# Patient Record
Sex: FEMALE | Race: ASIAN | Employment: FULL TIME | URBAN - METROPOLITAN AREA
[De-identification: names, ages, dates, MRNs, and addresses within clinical notes are randomized per-mention and may not be internally consistent; named-entity substitution may affect disease eponyms.]

---

## 2017-06-12 PROCEDURE — 86901 BLOOD TYPING SEROLOGIC RH(D): CPT | Performed by: OBSTETRICS & GYNECOLOGY

## 2017-06-12 PROCEDURE — 36415 COLL VENOUS BLD VENIPUNCTURE: CPT | Performed by: OBSTETRICS & GYNECOLOGY

## 2017-06-12 PROCEDURE — 86850 RBC ANTIBODY SCREEN: CPT | Performed by: OBSTETRICS & GYNECOLOGY

## 2017-06-12 PROCEDURE — 87340 HEPATITIS B SURFACE AG IA: CPT | Performed by: OBSTETRICS & GYNECOLOGY

## 2017-06-12 PROCEDURE — 86762 RUBELLA ANTIBODY: CPT | Performed by: OBSTETRICS & GYNECOLOGY

## 2017-06-12 PROCEDURE — 87389 HIV-1 AG W/HIV-1&-2 AB AG IA: CPT | Performed by: OBSTETRICS & GYNECOLOGY

## 2017-06-12 PROCEDURE — 88175 CYTOPATH C/V AUTO FLUID REDO: CPT | Performed by: OBSTETRICS & GYNECOLOGY

## 2017-06-12 PROCEDURE — 86803 HEPATITIS C AB TEST: CPT | Performed by: OBSTETRICS & GYNECOLOGY

## 2017-06-12 PROCEDURE — 86900 BLOOD TYPING SEROLOGIC ABO: CPT | Performed by: OBSTETRICS & GYNECOLOGY

## 2017-06-12 PROCEDURE — 85025 COMPLETE CBC W/AUTO DIFF WBC: CPT | Performed by: OBSTETRICS & GYNECOLOGY

## 2017-06-12 PROCEDURE — 86780 TREPONEMA PALLIDUM: CPT | Performed by: OBSTETRICS & GYNECOLOGY

## 2018-11-30 PROCEDURE — 88175 CYTOPATH C/V AUTO FLUID REDO: CPT | Performed by: OBSTETRICS & GYNECOLOGY

## 2019-08-28 ENCOUNTER — OFFICE VISIT (OUTPATIENT)
Dept: FAMILY MEDICINE CLINIC | Facility: CLINIC | Age: 29
End: 2019-08-28
Payer: COMMERCIAL

## 2019-08-28 ENCOUNTER — MED REC SCAN ONLY (OUTPATIENT)
Dept: FAMILY MEDICINE CLINIC | Facility: CLINIC | Age: 29
End: 2019-08-28

## 2019-08-28 VITALS
TEMPERATURE: 98 F | SYSTOLIC BLOOD PRESSURE: 100 MMHG | OXYGEN SATURATION: 99 % | DIASTOLIC BLOOD PRESSURE: 70 MMHG | RESPIRATION RATE: 16 BRPM | WEIGHT: 131 LBS | HEART RATE: 82 BPM | BODY MASS INDEX: 21.56 KG/M2 | HEIGHT: 65.5 IN

## 2019-08-28 DIAGNOSIS — R21 RASH: Primary | ICD-10-CM

## 2019-08-28 PROCEDURE — 99203 OFFICE O/P NEW LOW 30 MIN: CPT | Performed by: NURSE PRACTITIONER

## 2019-08-28 NOTE — PROGRESS NOTES
Connor Ortiz is a 34year old female. HPI:   Patient presents today as a new patient to establish care. Patient reporting a red, itchy, patchy rash intermittently for the past 1 week.  Reports that in the beginning she would get the rash to her arms and le 100/70   Pulse 82   Temp 98 °F (36.7 °C) (Oral)   Resp 16   Ht 65.5\"   Wt 131 lb   LMP 08/12/2019   SpO2 99%   BMI 21.47 kg/m²   GENERAL: well developed, well nourished,in no apparent distress  SKIN: mildly erythematous, blanchable rash noted to bilateral

## 2019-08-30 ENCOUNTER — OFFICE VISIT (OUTPATIENT)
Dept: FAMILY MEDICINE CLINIC | Facility: CLINIC | Age: 29
End: 2019-08-30
Payer: COMMERCIAL

## 2019-08-30 ENCOUNTER — APPOINTMENT (OUTPATIENT)
Dept: LAB | Age: 29
End: 2019-08-30
Attending: NURSE PRACTITIONER
Payer: COMMERCIAL

## 2019-08-30 VITALS
TEMPERATURE: 99 F | WEIGHT: 130 LBS | HEIGHT: 65.5 IN | DIASTOLIC BLOOD PRESSURE: 62 MMHG | RESPIRATION RATE: 16 BRPM | OXYGEN SATURATION: 97 % | HEART RATE: 74 BPM | SYSTOLIC BLOOD PRESSURE: 100 MMHG | BODY MASS INDEX: 21.4 KG/M2

## 2019-08-30 DIAGNOSIS — R74.8 ELEVATED ALKALINE PHOSPHATASE LEVEL: ICD-10-CM

## 2019-08-30 DIAGNOSIS — R79.89 ELEVATED LFTS: Primary | ICD-10-CM

## 2019-08-30 DIAGNOSIS — R79.89 ELEVATED LFTS: ICD-10-CM

## 2019-08-30 DIAGNOSIS — E83.51 HYPOCALCEMIA: ICD-10-CM

## 2019-08-30 LAB
ALBUMIN SERPL-MCNC: 3.9 G/DL (ref 3.4–5)
ALBUMIN/GLOB SERPL: 1.1 {RATIO} (ref 1–2)
ALP LIVER SERPL-CCNC: 158 U/L (ref 37–98)
ALT SERPL-CCNC: 117 U/L (ref 13–56)
ANION GAP SERPL CALC-SCNC: 8 MMOL/L (ref 0–18)
AST SERPL-CCNC: 51 U/L (ref 15–37)
BILIRUB SERPL-MCNC: 0.8 MG/DL (ref 0.1–2)
BUN BLD-MCNC: 12 MG/DL (ref 7–18)
BUN/CREAT SERPL: 16.7 (ref 10–20)
CALCIUM BLD-MCNC: 8.6 MG/DL (ref 8.5–10.1)
CALCIUM BLD-MCNC: 8.7 MG/DL (ref 8.5–10.1)
CHLORIDE SERPL-SCNC: 108 MMOL/L (ref 98–112)
CO2 SERPL-SCNC: 25 MMOL/L (ref 21–32)
CREAT BLD-MCNC: 0.72 MG/DL (ref 0.55–1.02)
GLOBULIN PLAS-MCNC: 3.6 G/DL (ref 2.8–4.4)
GLUCOSE BLD-MCNC: 85 MG/DL (ref 70–99)
HAV IGM SER QL: 2.2 MG/DL (ref 1.6–2.6)
HAV IGM SER QL: NONREACTIVE
HBV CORE IGM SER QL: NONREACTIVE
HBV SURFACE AG SERPL QL IA: NONREACTIVE
HCV AB SERPL QL IA: NONREACTIVE
M PROTEIN MFR SERPL ELPH: 7.5 G/DL (ref 6.4–8.2)
OSMOLALITY SERPL CALC.SUM OF ELEC: 291 MOSM/KG (ref 275–295)
POTASSIUM SERPL-SCNC: 3.9 MMOL/L (ref 3.5–5.1)
SODIUM SERPL-SCNC: 141 MMOL/L (ref 136–145)

## 2019-08-30 PROCEDURE — 99213 OFFICE O/P EST LOW 20 MIN: CPT | Performed by: NURSE PRACTITIONER

## 2019-08-30 PROCEDURE — 80053 COMPREHEN METABOLIC PANEL: CPT | Performed by: NURSE PRACTITIONER

## 2019-08-30 PROCEDURE — 80074 ACUTE HEPATITIS PANEL: CPT | Performed by: NURSE PRACTITIONER

## 2019-08-30 PROCEDURE — 84080 ASSAY ALKALINE PHOSPHATASES: CPT | Performed by: NURSE PRACTITIONER

## 2019-08-30 PROCEDURE — 83735 ASSAY OF MAGNESIUM: CPT | Performed by: NURSE PRACTITIONER

## 2019-08-30 PROCEDURE — 36415 COLL VENOUS BLD VENIPUNCTURE: CPT | Performed by: NURSE PRACTITIONER

## 2019-08-30 NOTE — PROGRESS NOTES
Casa Keller is a 34year old female. HPI:   Patient presents today to discuss recent lab work she had completed with her employer earlier this week. She has brought the labs for me to view on her computer today- she did not print these results out.  Resul distress  HEENT: TM clear bilaterally, nose no congestion, throat clear no erythema without mass.    EYES: PERRLA, EOM intact, sclera clear without injection  NECK: supple,no adenopathy, thyroid normal to palpation  LUNGS: clear to auscultation no rales, rh recommendations.

## 2019-09-02 LAB
ALK-PHOSPHATASE BONE CALC: 30 U/L
ALK-PHOSPHATASE LIVER CALC: 118 U/L
ALK-PHOSPHATASE OTHER CALC: 0 U/L
ALKALINE PHOSPHATASE: 148 U/L

## 2021-02-05 ENCOUNTER — OFFICE VISIT (OUTPATIENT)
Dept: FAMILY MEDICINE CLINIC | Facility: CLINIC | Age: 31
End: 2021-02-05
Payer: COMMERCIAL

## 2021-02-05 VITALS
DIASTOLIC BLOOD PRESSURE: 74 MMHG | RESPIRATION RATE: 16 BRPM | HEART RATE: 80 BPM | SYSTOLIC BLOOD PRESSURE: 114 MMHG | BODY MASS INDEX: 21.73 KG/M2 | WEIGHT: 132 LBS | TEMPERATURE: 98 F | HEIGHT: 65.5 IN

## 2021-02-05 DIAGNOSIS — Z23 NEED FOR VACCINATION: ICD-10-CM

## 2021-02-05 DIAGNOSIS — Z13.228 SCREENING FOR ENDOCRINE, METABOLIC AND IMMUNITY DISORDER: ICD-10-CM

## 2021-02-05 DIAGNOSIS — Z13.29 SCREENING FOR ENDOCRINE, METABOLIC AND IMMUNITY DISORDER: ICD-10-CM

## 2021-02-05 DIAGNOSIS — R10.2 PELVIC PAIN: ICD-10-CM

## 2021-02-05 DIAGNOSIS — Z13.89 SCREENING FOR GENITOURINARY CONDITION: ICD-10-CM

## 2021-02-05 DIAGNOSIS — Z13.0 SCREENING FOR ENDOCRINE, METABOLIC AND IMMUNITY DISORDER: ICD-10-CM

## 2021-02-05 DIAGNOSIS — Z00.00 LABORATORY EXAMINATION ORDERED AS PART OF A ROUTINE GENERAL MEDICAL EXAMINATION: ICD-10-CM

## 2021-02-05 DIAGNOSIS — Z00.00 ROUTINE GENERAL MEDICAL EXAMINATION AT A HEALTH CARE FACILITY: Primary | ICD-10-CM

## 2021-02-05 DIAGNOSIS — Z30.09 FAMILY PLANNING COUNSELING: ICD-10-CM

## 2021-02-05 PROCEDURE — 99395 PREV VISIT EST AGE 18-39: CPT | Performed by: NURSE PRACTITIONER

## 2021-02-05 PROCEDURE — 3078F DIAST BP <80 MM HG: CPT | Performed by: NURSE PRACTITIONER

## 2021-02-05 PROCEDURE — 3008F BODY MASS INDEX DOCD: CPT | Performed by: NURSE PRACTITIONER

## 2021-02-05 PROCEDURE — 90471 IMMUNIZATION ADMIN: CPT | Performed by: NURSE PRACTITIONER

## 2021-02-05 PROCEDURE — 3074F SYST BP LT 130 MM HG: CPT | Performed by: NURSE PRACTITIONER

## 2021-02-05 PROCEDURE — 90715 TDAP VACCINE 7 YRS/> IM: CPT | Performed by: NURSE PRACTITIONER

## 2021-02-05 NOTE — H&P
HPI:   Gilbert Mathur is a 27year old female who presents for a complete physical exam. Symptoms: denies discharge, itching, burning or dysuria, periods are regular, flow is 6 days, LMP: 01/24/2021. Pap 11/2019 with Dr. Fabiola Del Toro- normal pap.  No history of abno denies cough or shortness of breath with exertion  CHEST:  denies breast changes or pain  CARDIOVASCULAR: denies chest pain or tightness on exertion: no edema  VASCULAR: denies leg cramps  GI: denies abdominal pain, bowel movement changes, blood in stool 30 minutes 3-4 times weekly. Condoms if applicable. The patient indicates understanding of these issues and agrees to the plan. The patient is asked to return for complete physical yearly. There are no Patient Instructions on file for this visit.   No

## 2021-02-08 ENCOUNTER — LAB ENCOUNTER (OUTPATIENT)
Dept: LAB | Age: 31
End: 2021-02-08
Attending: NURSE PRACTITIONER
Payer: COMMERCIAL

## 2021-02-08 DIAGNOSIS — Z13.228 SCREENING FOR ENDOCRINE, METABOLIC AND IMMUNITY DISORDER: ICD-10-CM

## 2021-02-08 DIAGNOSIS — Z13.29 SCREENING FOR ENDOCRINE, METABOLIC AND IMMUNITY DISORDER: ICD-10-CM

## 2021-02-08 DIAGNOSIS — Z00.00 LABORATORY EXAMINATION ORDERED AS PART OF A ROUTINE GENERAL MEDICAL EXAMINATION: ICD-10-CM

## 2021-02-08 DIAGNOSIS — Z13.0 SCREENING FOR ENDOCRINE, METABOLIC AND IMMUNITY DISORDER: ICD-10-CM

## 2021-02-08 DIAGNOSIS — Z00.00 ROUTINE GENERAL MEDICAL EXAMINATION AT A HEALTH CARE FACILITY: ICD-10-CM

## 2021-02-08 DIAGNOSIS — Z13.89 SCREENING FOR GENITOURINARY CONDITION: ICD-10-CM

## 2021-02-08 LAB
ALBUMIN SERPL-MCNC: 4 G/DL (ref 3.4–5)
ALBUMIN/GLOB SERPL: 1 {RATIO} (ref 1–2)
ALP LIVER SERPL-CCNC: 62 U/L
ALT SERPL-CCNC: 30 U/L
ANION GAP SERPL CALC-SCNC: 6 MMOL/L (ref 0–18)
AST SERPL-CCNC: 12 U/L (ref 15–37)
BASOPHILS # BLD AUTO: 0.06 X10(3) UL (ref 0–0.2)
BASOPHILS NFR BLD AUTO: 0.8 %
BILIRUB SERPL-MCNC: 0.8 MG/DL (ref 0.1–2)
BILIRUB UR QL STRIP.AUTO: NEGATIVE
BUN BLD-MCNC: 14 MG/DL (ref 7–18)
BUN/CREAT SERPL: 17.1 (ref 10–20)
CALCIUM BLD-MCNC: 8.9 MG/DL (ref 8.5–10.1)
CHLORIDE SERPL-SCNC: 110 MMOL/L (ref 98–112)
CHOLEST SMN-MCNC: 168 MG/DL (ref ?–200)
CO2 SERPL-SCNC: 23 MMOL/L (ref 21–32)
COLOR UR AUTO: YELLOW
CREAT BLD-MCNC: 0.82 MG/DL
DEPRECATED RDW RBC AUTO: 42.4 FL (ref 35.1–46.3)
EOSINOPHIL # BLD AUTO: 0.15 X10(3) UL (ref 0–0.7)
EOSINOPHIL NFR BLD AUTO: 2 %
ERYTHROCYTE [DISTWIDTH] IN BLOOD BY AUTOMATED COUNT: 13.2 % (ref 11–15)
GLOBULIN PLAS-MCNC: 3.9 G/DL (ref 2.8–4.4)
GLUCOSE BLD-MCNC: 90 MG/DL (ref 70–99)
GLUCOSE UR STRIP.AUTO-MCNC: NEGATIVE MG/DL
HAV AB SER QL IA: REACTIVE
HAV IGM SER QL: NONREACTIVE
HBV SURFACE AB SER QL: NONREACTIVE
HBV SURFACE AB SERPL IA-ACNC: 7.98 MIU/ML
HCT VFR BLD AUTO: 40.6 %
HDLC SERPL-MCNC: 69 MG/DL (ref 40–59)
HGB BLD-MCNC: 13.6 G/DL
IMM GRANULOCYTES # BLD AUTO: 0.02 X10(3) UL (ref 0–1)
IMM GRANULOCYTES NFR BLD: 0.3 %
KETONES UR STRIP.AUTO-MCNC: NEGATIVE MG/DL
LDLC SERPL CALC-MCNC: 83 MG/DL (ref ?–100)
LYMPHOCYTES # BLD AUTO: 2.53 X10(3) UL (ref 1–4)
LYMPHOCYTES NFR BLD AUTO: 33 %
M PROTEIN MFR SERPL ELPH: 7.9 G/DL (ref 6.4–8.2)
MCH RBC QN AUTO: 29.5 PG (ref 26–34)
MCHC RBC AUTO-ENTMCNC: 33.5 G/DL (ref 31–37)
MCV RBC AUTO: 88.1 FL
MONOCYTES # BLD AUTO: 0.52 X10(3) UL (ref 0.1–1)
MONOCYTES NFR BLD AUTO: 6.8 %
NEUTROPHILS # BLD AUTO: 4.39 X10 (3) UL (ref 1.5–7.7)
NEUTROPHILS # BLD AUTO: 4.39 X10(3) UL (ref 1.5–7.7)
NEUTROPHILS NFR BLD AUTO: 57.1 %
NITRITE UR QL STRIP.AUTO: NEGATIVE
NONHDLC SERPL-MCNC: 99 MG/DL (ref ?–130)
OSMOLALITY SERPL CALC.SUM OF ELEC: 288 MOSM/KG (ref 275–295)
PATIENT FASTING Y/N/NP: YES
PATIENT FASTING Y/N/NP: YES
PH UR STRIP.AUTO: 5 [PH] (ref 4.5–8)
PLATELET # BLD AUTO: 289 10(3)UL (ref 150–450)
POTASSIUM SERPL-SCNC: 3.7 MMOL/L (ref 3.5–5.1)
PROT UR STRIP.AUTO-MCNC: NEGATIVE MG/DL
RBC # BLD AUTO: 4.61 X10(6)UL
RBC UR QL AUTO: NEGATIVE
RUBV IGG SER-ACNC: 9.9 IU/ML (ref 10–?)
SODIUM SERPL-SCNC: 139 MMOL/L (ref 136–145)
SP GR UR STRIP.AUTO: 1.02 (ref 1–1.03)
T4 FREE SERPL-MCNC: 0.9 NG/DL (ref 0.8–1.7)
TRIGL SERPL-MCNC: 82 MG/DL (ref 30–149)
TSI SER-ACNC: 2.55 MIU/ML (ref 0.36–3.74)
UROBILINOGEN UR STRIP.AUTO-MCNC: <2 MG/DL
VLDLC SERPL CALC-MCNC: 16 MG/DL (ref 0–30)
WBC # BLD AUTO: 7.7 X10(3) UL (ref 4–11)

## 2021-02-08 PROCEDURE — 86787 VARICELLA-ZOSTER ANTIBODY: CPT

## 2021-02-08 PROCEDURE — 86709 HEPATITIS A IGM ANTIBODY: CPT

## 2021-02-08 PROCEDURE — 86708 HEPATITIS A ANTIBODY: CPT

## 2021-02-08 PROCEDURE — 86735 MUMPS ANTIBODY: CPT

## 2021-02-08 PROCEDURE — 36415 COLL VENOUS BLD VENIPUNCTURE: CPT

## 2021-02-08 PROCEDURE — 80053 COMPREHEN METABOLIC PANEL: CPT

## 2021-02-08 PROCEDURE — 85025 COMPLETE CBC W/AUTO DIFF WBC: CPT

## 2021-02-08 PROCEDURE — 81001 URINALYSIS AUTO W/SCOPE: CPT

## 2021-02-08 PROCEDURE — 86762 RUBELLA ANTIBODY: CPT

## 2021-02-08 PROCEDURE — 80061 LIPID PANEL: CPT

## 2021-02-08 PROCEDURE — 86765 RUBEOLA ANTIBODY: CPT

## 2021-02-08 PROCEDURE — 84443 ASSAY THYROID STIM HORMONE: CPT

## 2021-02-08 PROCEDURE — 84439 ASSAY OF FREE THYROXINE: CPT

## 2021-02-08 PROCEDURE — 86706 HEP B SURFACE ANTIBODY: CPT

## 2021-02-10 ENCOUNTER — HOSPITAL ENCOUNTER (OUTPATIENT)
Dept: ULTRASOUND IMAGING | Age: 31
Discharge: HOME | End: 2021-02-10
Attending: NURSE PRACTITIONER
Payer: COMMERCIAL

## 2021-02-10 DIAGNOSIS — R10.2 PELVIC PAIN: ICD-10-CM

## 2021-02-10 LAB
MEV IGG SER-ACNC: 25.7 AU/ML (ref 16.5–?)
MUV IGG SER IA-ACNC: 7.2 AU/ML (ref 11–?)
VZV IGG SER IA-ACNC: 16.06 (ref 165–?)

## 2021-02-10 PROCEDURE — 76856 US EXAM PELVIC COMPLETE: CPT | Performed by: NURSE PRACTITIONER

## 2021-02-10 PROCEDURE — 76830 TRANSVAGINAL US NON-OB: CPT | Performed by: NURSE PRACTITIONER

## 2021-02-22 ENCOUNTER — NURSE ONLY (OUTPATIENT)
Dept: FAMILY MEDICINE CLINIC | Facility: CLINIC | Age: 31
End: 2021-02-22
Payer: COMMERCIAL

## 2021-02-22 DIAGNOSIS — Z23 NEED FOR VACCINATION: Primary | ICD-10-CM

## 2021-02-22 PROCEDURE — 90746 HEPB VACCINE 3 DOSE ADULT IM: CPT | Performed by: NURSE PRACTITIONER

## 2021-02-22 PROCEDURE — 90707 MMR VACCINE SC: CPT | Performed by: NURSE PRACTITIONER

## 2021-02-22 PROCEDURE — 90471 IMMUNIZATION ADMIN: CPT | Performed by: NURSE PRACTITIONER

## 2021-02-22 PROCEDURE — 90472 IMMUNIZATION ADMIN EACH ADD: CPT | Performed by: NURSE PRACTITIONER

## 2021-02-22 PROCEDURE — 90716 VAR VACCINE LIVE SUBQ: CPT | Performed by: NURSE PRACTITIONER

## 2021-03-18 ENCOUNTER — TELEPHONE (OUTPATIENT)
Dept: FAMILY MEDICINE CLINIC | Facility: CLINIC | Age: 31
End: 2021-03-18

## 2021-03-18 NOTE — TELEPHONE ENCOUNTER
Pt received first 505 [employer] vaccine today. She is scheduled on 3/23 for Varicella # 2 and hep B # 2. She is asking if it is OK to still get the vaccine in our office on the 23rd or should she wait.

## 2021-06-07 ENCOUNTER — LAB ENCOUNTER (OUTPATIENT)
Dept: LAB | Age: 31
End: 2021-06-07
Attending: OBSTETRICS & GYNECOLOGY
Payer: COMMERCIAL

## 2021-06-07 DIAGNOSIS — N92.6 IRREGULAR MENSES: ICD-10-CM

## 2021-06-07 DIAGNOSIS — Z31.69 PRE-CONCEPTION COUNSELING: ICD-10-CM

## 2021-06-07 PROCEDURE — 83002 ASSAY OF GONADOTROPIN (LH): CPT

## 2021-06-07 PROCEDURE — 83001 ASSAY OF GONADOTROPIN (FSH): CPT

## 2021-06-07 PROCEDURE — 84146 ASSAY OF PROLACTIN: CPT

## 2021-06-07 PROCEDURE — 84402 ASSAY OF FREE TESTOSTERONE: CPT

## 2021-06-07 PROCEDURE — 84403 ASSAY OF TOTAL TESTOSTERONE: CPT

## 2021-06-07 PROCEDURE — 83520 IMMUNOASSAY QUANT NOS NONAB: CPT

## 2021-06-07 PROCEDURE — 82670 ASSAY OF TOTAL ESTRADIOL: CPT

## 2021-06-07 PROCEDURE — 36415 COLL VENOUS BLD VENIPUNCTURE: CPT

## 2021-08-21 PROBLEM — Z34.90 SUPERVISION OF NORMAL PREGNANCY: Status: ACTIVE | Noted: 2021-08-21

## 2021-08-21 PROBLEM — Z34.90 SUPERVISION OF NORMAL PREGNANCY (HCC): Status: ACTIVE | Noted: 2021-08-21

## 2021-12-13 ENCOUNTER — TELEPHONE (OUTPATIENT)
Dept: FAMILY MEDICINE CLINIC | Facility: CLINIC | Age: 31
End: 2021-12-13

## 2021-12-13 NOTE — TELEPHONE ENCOUNTER
Pt was told by OB that she needs to get Tdap vaccination after 28 wks. Pt states she will be due the last week of Dec. Please place order and bring name to MANUELA SCHMITT to call back to schedule once order is placed.

## 2021-12-13 NOTE — TELEPHONE ENCOUNTER
S/W pt and informed her that she had a TDAP on 2/5/2021. Advised her to let her OB know with the date and to find out if OB still need her to have another one. Voiced understanding.

## 2021-12-20 PROBLEM — O24.419 GESTATIONAL DIABETES MELLITUS (GDM), ANTEPARTUM: Status: ACTIVE | Noted: 2021-12-20

## 2021-12-20 PROBLEM — O24.419 GESTATIONAL DIABETES MELLITUS (GDM), ANTEPARTUM (HCC): Status: ACTIVE | Noted: 2021-12-20

## 2022-01-06 ENCOUNTER — TELEPHONE (OUTPATIENT)
Dept: FAMILY MEDICINE CLINIC | Facility: CLINIC | Age: 32
End: 2022-01-06

## 2022-01-06 DIAGNOSIS — R00.0 INCREASED HEART RATE: Primary | ICD-10-CM

## 2022-01-06 NOTE — TELEPHONE ENCOUNTER
I think the 13th would be okay. What always see if we can get an echocardiogram and a 24-hour Holter done prior.   If she has not had a recent thyroid panel I would do thyroid panel plus a CMP and CBC

## 2022-01-06 NOTE — TELEPHONE ENCOUNTER
S/W pt. She is 31 weeks pregnant. She saw her OB/GYN today and reported having an elevated heart rate in the 120's at night only. They advised her to call her PCP. This has been going on for the past 1 1/2 weeks. She denies chest pain.  She notices it is

## 2022-01-06 NOTE — TELEPHONE ENCOUNTER
I spoke with pt. She is aware she can keep the appointment with Araseli Bledsoe on 01/13/2022. Pt is going to try and get labs that were ordered and 2 D echo and 24 hour holter monitor done prior to appointment.  Pt agreed to plan and verbalized understanding

## 2022-01-06 NOTE — TELEPHONE ENCOUNTER
Pt has scheduled appt via [employer]/Araceli James for the following:    Message    Appointment For: Alfredo Ericka ([id number])   Visit Type: Toshia Macias ([id number])      1/13/2022    8:00 AM  30 mins. INOCENTE Waddell      [facility] [address] [facility]      Patient Com

## 2022-01-13 ENCOUNTER — OFFICE VISIT (OUTPATIENT)
Dept: FAMILY MEDICINE CLINIC | Facility: CLINIC | Age: 32
End: 2022-01-13
Payer: COMMERCIAL

## 2022-01-13 VITALS
OXYGEN SATURATION: 98 % | TEMPERATURE: 98 F | WEIGHT: 149 LBS | RESPIRATION RATE: 20 BRPM | SYSTOLIC BLOOD PRESSURE: 110 MMHG | HEIGHT: 65 IN | HEART RATE: 82 BPM | BODY MASS INDEX: 24.83 KG/M2 | DIASTOLIC BLOOD PRESSURE: 72 MMHG

## 2022-01-13 DIAGNOSIS — Z3A.31 31 WEEKS GESTATION OF PREGNANCY: ICD-10-CM

## 2022-01-13 DIAGNOSIS — R00.2 PALPITATIONS: Primary | ICD-10-CM

## 2022-01-13 PROCEDURE — 99213 OFFICE O/P EST LOW 20 MIN: CPT | Performed by: NURSE PRACTITIONER

## 2022-01-13 PROCEDURE — 3008F BODY MASS INDEX DOCD: CPT | Performed by: NURSE PRACTITIONER

## 2022-01-13 PROCEDURE — 3078F DIAST BP <80 MM HG: CPT | Performed by: NURSE PRACTITIONER

## 2022-01-13 PROCEDURE — 3074F SYST BP LT 130 MM HG: CPT | Performed by: NURSE PRACTITIONER

## 2022-01-13 NOTE — PROGRESS NOTES
Alfredo Barnett is a 32year old female. HPI:   Patient presents today reporting rapid heartbeat and palpitations that started approximately 2 weeks ago. Reports last episode was 1 day ago prior to this no symptoms for 1 week.  She has an iWatch and her heart Breastfeeding No   BMI 24.79 kg/m²   GENERAL: well developed, well nourished,in no apparent distress  HEENT: TM clear bilaterally, nose no congestion, throat clear no erythema without mass.    EYES: sclera clear without injection  NECK: supple,no adenopathy

## 2022-01-14 ENCOUNTER — LAB ENCOUNTER (OUTPATIENT)
Dept: LAB | Age: 32
End: 2022-01-14
Attending: NURSE PRACTITIONER
Payer: COMMERCIAL

## 2022-01-14 DIAGNOSIS — R00.0 INCREASED HEART RATE: ICD-10-CM

## 2022-01-14 LAB
ALBUMIN SERPL-MCNC: 3 G/DL (ref 3.4–5)
ALBUMIN/GLOB SERPL: 1 {RATIO} (ref 1–2)
ALP LIVER SERPL-CCNC: 64 U/L
ALT SERPL-CCNC: 16 U/L
ANION GAP SERPL CALC-SCNC: 9 MMOL/L (ref 0–18)
AST SERPL-CCNC: 11 U/L (ref 15–37)
BASOPHILS # BLD AUTO: 0.05 X10(3) UL (ref 0–0.2)
BASOPHILS NFR BLD AUTO: 0.4 %
BILIRUB SERPL-MCNC: 0.3 MG/DL (ref 0.1–2)
BUN BLD-MCNC: 8 MG/DL (ref 7–18)
CALCIUM BLD-MCNC: 8.4 MG/DL (ref 8.5–10.1)
CHLORIDE SERPL-SCNC: 111 MMOL/L (ref 98–112)
CO2 SERPL-SCNC: 20 MMOL/L (ref 21–32)
CREAT BLD-MCNC: 0.45 MG/DL
EOSINOPHIL # BLD AUTO: 0.06 X10(3) UL (ref 0–0.7)
EOSINOPHIL NFR BLD AUTO: 0.5 %
ERYTHROCYTE [DISTWIDTH] IN BLOOD BY AUTOMATED COUNT: 14 %
FASTING STATUS PATIENT QL REPORTED: YES
GLOBULIN PLAS-MCNC: 3 G/DL (ref 2.8–4.4)
GLUCOSE BLD-MCNC: 86 MG/DL (ref 70–99)
HCT VFR BLD AUTO: 36.9 %
HGB BLD-MCNC: 11.7 G/DL
IMM GRANULOCYTES # BLD AUTO: 0.34 X10(3) UL (ref 0–1)
IMM GRANULOCYTES NFR BLD: 3 %
LYMPHOCYTES # BLD AUTO: 1.86 X10(3) UL (ref 1–4)
LYMPHOCYTES NFR BLD AUTO: 16.3 %
MCH RBC QN AUTO: 30.2 PG (ref 26–34)
MCHC RBC AUTO-ENTMCNC: 31.7 G/DL (ref 31–37)
MCV RBC AUTO: 95.3 FL
MONOCYTES # BLD AUTO: 0.64 X10(3) UL (ref 0.1–1)
MONOCYTES NFR BLD AUTO: 5.6 %
NEUTROPHILS # BLD AUTO: 8.47 X10 (3) UL (ref 1.5–7.7)
NEUTROPHILS # BLD AUTO: 8.47 X10(3) UL (ref 1.5–7.7)
NEUTROPHILS NFR BLD AUTO: 74.2 %
OSMOLALITY SERPL CALC.SUM OF ELEC: 288 MOSM/KG (ref 275–295)
PLATELET # BLD AUTO: 146 10(3)UL (ref 150–450)
POTASSIUM SERPL-SCNC: 4.1 MMOL/L (ref 3.5–5.1)
PROT SERPL-MCNC: 6 G/DL (ref 6.4–8.2)
RBC # BLD AUTO: 3.87 X10(6)UL
SODIUM SERPL-SCNC: 140 MMOL/L (ref 136–145)
T4 FREE SERPL-MCNC: 1 NG/DL (ref 0.8–1.7)
TSI SER-ACNC: 1.34 MIU/ML (ref 0.36–3.74)
WBC # BLD AUTO: 11.4 X10(3) UL (ref 4–11)

## 2022-01-14 PROCEDURE — 80053 COMPREHEN METABOLIC PANEL: CPT

## 2022-01-14 PROCEDURE — 84443 ASSAY THYROID STIM HORMONE: CPT

## 2022-01-14 PROCEDURE — 36415 COLL VENOUS BLD VENIPUNCTURE: CPT

## 2022-01-14 PROCEDURE — 85025 COMPLETE CBC W/AUTO DIFF WBC: CPT

## 2022-01-14 PROCEDURE — 84439 ASSAY OF FREE THYROXINE: CPT

## 2022-01-18 PROBLEM — D69.6 THROMBOCYTOPENIA AFFECTING PREGNANCY (HCC): Status: ACTIVE | Noted: 2022-01-18

## 2022-01-18 PROBLEM — O99.119 THROMBOCYTOPENIA AFFECTING PREGNANCY (HCC): Status: ACTIVE | Noted: 2022-01-18

## 2022-03-07 ENCOUNTER — HOSPITAL ENCOUNTER (OUTPATIENT)
Dept: CV DIAGNOSTICS | Facility: HOSPITAL | Age: 32
Discharge: HOME | End: 2022-03-07
Attending: NURSE PRACTITIONER
Payer: COMMERCIAL

## 2022-03-07 DIAGNOSIS — R00.0 INCREASED HEART RATE: ICD-10-CM

## 2022-03-07 DIAGNOSIS — R93.1 ABNORMAL ECHOCARDIOGRAM: Primary | ICD-10-CM

## 2022-03-07 PROCEDURE — 93226 XTRNL ECG REC<48 HR SCAN A/R: CPT | Performed by: NURSE PRACTITIONER

## 2022-03-07 PROCEDURE — 93225 XTRNL ECG REC<48 HRS REC: CPT | Performed by: NURSE PRACTITIONER

## 2022-03-07 PROCEDURE — 93306 TTE W/DOPPLER COMPLETE: CPT | Performed by: NURSE PRACTITIONER

## 2022-03-07 PROCEDURE — 93227 XTRNL ECG REC<48 HR R&I: CPT | Performed by: NURSE PRACTITIONER

## 2022-03-08 ENCOUNTER — TELEPHONE (OUTPATIENT)
Dept: OBGYN UNIT | Facility: HOSPITAL | Age: 32
End: 2022-03-08

## 2022-03-09 ENCOUNTER — APPOINTMENT (OUTPATIENT)
Dept: OBGYN CLINIC | Facility: HOSPITAL | Age: 32
End: 2022-03-09
Payer: COMMERCIAL

## 2022-03-09 ENCOUNTER — HOSPITAL ENCOUNTER (INPATIENT)
Facility: HOSPITAL | Age: 32
LOS: 3 days | Discharge: HOME | End: 2022-03-12
Attending: OBSTETRICS & GYNECOLOGY | Admitting: OBSTETRICS & GYNECOLOGY
Payer: COMMERCIAL

## 2022-03-09 PROBLEM — Z34.90 PREGNANCY (HCC): Status: ACTIVE | Noted: 2022-03-09

## 2022-03-09 PROBLEM — Z34.90 PREGNANCY: Status: ACTIVE | Noted: 2022-03-09

## 2022-03-09 LAB
ANTIBODY SCREEN: NEGATIVE
BASOPHILS # BLD AUTO: 0.05 X10(3) UL (ref 0–0.2)
BASOPHILS NFR BLD AUTO: 0.5 %
EOSINOPHIL # BLD AUTO: 0.06 X10(3) UL (ref 0–0.7)
EOSINOPHIL NFR BLD AUTO: 0.6 %
ERYTHROCYTE [DISTWIDTH] IN BLOOD BY AUTOMATED COUNT: 13.9 %
GLUCOSE BLD-MCNC: 96 MG/DL (ref 70–99)
HCT VFR BLD AUTO: 40.4 %
HGB BLD-MCNC: 13.8 G/DL
HIV 1+2 AB+HIV1 P24 AG SERPL QL IA: NONREACTIVE
IMM GRANULOCYTES # BLD AUTO: 0.11 X10(3) UL (ref 0–1)
IMM GRANULOCYTES NFR BLD: 1 %
LYMPHOCYTES # BLD AUTO: 1.99 X10(3) UL (ref 1–4)
LYMPHOCYTES NFR BLD AUTO: 18.3 %
MCH RBC QN AUTO: 31.7 PG (ref 26–34)
MCHC RBC AUTO-ENTMCNC: 34.2 G/DL (ref 31–37)
MCV RBC AUTO: 92.9 FL
MONOCYTES NFR BLD AUTO: 6.4 %
NEUTROPHILS # BLD AUTO: 7.96 X10 (3) UL (ref 1.5–7.7)
NEUTROPHILS # BLD AUTO: 7.96 X10(3) UL (ref 1.5–7.7)
NEUTROPHILS NFR BLD AUTO: 73.2 %
PLATELET # BLD AUTO: 150 10(3)UL (ref 150–450)
RBC # BLD AUTO: 4.35 X10(6)UL
RH BLOOD TYPE: POSITIVE
T PALLIDUM AB SER QL IA: NONREACTIVE
WBC # BLD AUTO: 10.9 X10(3) UL (ref 4–11)

## 2022-03-09 PROCEDURE — 86780 TREPONEMA PALLIDUM: CPT | Performed by: OBSTETRICS & GYNECOLOGY

## 2022-03-09 PROCEDURE — 82947 ASSAY GLUCOSE BLOOD QUANT: CPT | Performed by: OBSTETRICS & GYNECOLOGY

## 2022-03-09 PROCEDURE — 85025 COMPLETE CBC W/AUTO DIFF WBC: CPT | Performed by: OBSTETRICS & GYNECOLOGY

## 2022-03-09 PROCEDURE — 3E0P7VZ INTRODUCTION OF HORMONE INTO FEMALE REPRODUCTIVE, VIA NATURAL OR ARTIFICIAL OPENING: ICD-10-PCS | Performed by: OBSTETRICS & GYNECOLOGY

## 2022-03-09 PROCEDURE — 86901 BLOOD TYPING SEROLOGIC RH(D): CPT | Performed by: OBSTETRICS & GYNECOLOGY

## 2022-03-09 PROCEDURE — 86850 RBC ANTIBODY SCREEN: CPT | Performed by: OBSTETRICS & GYNECOLOGY

## 2022-03-09 PROCEDURE — 86701 HIV-1ANTIBODY: CPT | Performed by: OBSTETRICS & GYNECOLOGY

## 2022-03-09 PROCEDURE — 86900 BLOOD TYPING SEROLOGIC ABO: CPT | Performed by: OBSTETRICS & GYNECOLOGY

## 2022-03-09 RX ORDER — SODIUM CHLORIDE, SODIUM LACTATE, POTASSIUM CHLORIDE, CALCIUM CHLORIDE 600; 310; 30; 20 MG/100ML; MG/100ML; MG/100ML; MG/100ML
INJECTION, SOLUTION INTRAVENOUS CONTINUOUS
Status: DISCONTINUED | OUTPATIENT
Start: 2022-03-09 | End: 2022-03-10 | Stop reason: HOSPADM

## 2022-03-09 RX ORDER — AMMONIA INHALANTS 0.04 G/.3ML
0.3 INHALANT RESPIRATORY (INHALATION) AS NEEDED
Status: DISCONTINUED | OUTPATIENT
Start: 2022-03-09 | End: 2022-03-10 | Stop reason: HOSPADM

## 2022-03-09 RX ORDER — TERBUTALINE SULFATE 1 MG/ML
0.25 INJECTION, SOLUTION SUBCUTANEOUS AS NEEDED
Status: DISCONTINUED | OUTPATIENT
Start: 2022-03-09 | End: 2022-03-10 | Stop reason: HOSPADM

## 2022-03-09 RX ORDER — ONDANSETRON 2 MG/ML
4 INJECTION INTRAMUSCULAR; INTRAVENOUS EVERY 6 HOURS PRN
Status: DISCONTINUED | OUTPATIENT
Start: 2022-03-09 | End: 2022-03-10 | Stop reason: HOSPADM

## 2022-03-09 RX ORDER — ACETAMINOPHEN 500 MG
500 TABLET ORAL EVERY 6 HOURS PRN
Status: DISCONTINUED | OUTPATIENT
Start: 2022-03-09 | End: 2022-03-10 | Stop reason: HOSPADM

## 2022-03-09 RX ORDER — IBUPROFEN 600 MG/1
600 TABLET ORAL EVERY 6 HOURS PRN
Status: DISCONTINUED | OUTPATIENT
Start: 2022-03-09 | End: 2022-03-10 | Stop reason: HOSPADM

## 2022-03-09 RX ORDER — TRISODIUM CITRATE DIHYDRATE AND CITRIC ACID MONOHYDRATE 500; 334 MG/5ML; MG/5ML
30 SOLUTION ORAL AS NEEDED
Status: DISCONTINUED | OUTPATIENT
Start: 2022-03-09 | End: 2022-03-10 | Stop reason: HOSPADM

## 2022-03-09 RX ORDER — HYDROMORPHONE HYDROCHLORIDE 1 MG/ML
1 INJECTION, SOLUTION INTRAMUSCULAR; INTRAVENOUS; SUBCUTANEOUS
Status: DISCONTINUED | OUTPATIENT
Start: 2022-03-09 | End: 2022-03-10

## 2022-03-09 RX ORDER — DEXTROSE, SODIUM CHLORIDE, SODIUM LACTATE, POTASSIUM CHLORIDE, AND CALCIUM CHLORIDE 5; .6; .31; .03; .02 G/100ML; G/100ML; G/100ML; G/100ML; G/100ML
INJECTION, SOLUTION INTRAVENOUS AS NEEDED
Status: DISCONTINUED | OUTPATIENT
Start: 2022-03-09 | End: 2022-03-10 | Stop reason: HOSPADM

## 2022-03-10 ENCOUNTER — ANESTHESIA EVENT (OUTPATIENT)
Dept: OBGYN UNIT | Facility: HOSPITAL | Age: 32
End: 2022-03-10
Payer: COMMERCIAL

## 2022-03-10 ENCOUNTER — ANESTHESIA (OUTPATIENT)
Dept: OBGYN UNIT | Facility: HOSPITAL | Age: 32
End: 2022-03-10
Payer: COMMERCIAL

## 2022-03-10 PROCEDURE — 3E033VJ INTRODUCTION OF OTHER HORMONE INTO PERIPHERAL VEIN, PERCUTANEOUS APPROACH: ICD-10-PCS | Performed by: OBSTETRICS & GYNECOLOGY

## 2022-03-10 PROCEDURE — 0KQM0ZZ REPAIR PERINEUM MUSCLE, OPEN APPROACH: ICD-10-PCS | Performed by: OBSTETRICS & GYNECOLOGY

## 2022-03-10 RX ORDER — DOCUSATE SODIUM 100 MG/1
100 CAPSULE, LIQUID FILLED ORAL
Status: DISCONTINUED | OUTPATIENT
Start: 2022-03-10 | End: 2022-03-12

## 2022-03-10 RX ORDER — LIDOCAINE HYDROCHLORIDE AND EPINEPHRINE 15; 5 MG/ML; UG/ML
INJECTION, SOLUTION EPIDURAL AS NEEDED
Status: DISCONTINUED | OUTPATIENT
Start: 2022-03-10 | End: 2022-03-10 | Stop reason: SURG

## 2022-03-10 RX ORDER — TRANEXAMIC ACID 10 MG/ML
1000 INJECTION, SOLUTION INTRAVENOUS ONCE
Status: COMPLETED | OUTPATIENT
Start: 2022-03-10 | End: 2022-03-10

## 2022-03-10 RX ORDER — NALBUPHINE HCL 10 MG/ML
2.5 AMPUL (ML) INJECTION
Status: DISCONTINUED | OUTPATIENT
Start: 2022-03-10 | End: 2022-03-10

## 2022-03-10 RX ORDER — LIDOCAINE HYDROCHLORIDE 10 MG/ML
INJECTION, SOLUTION EPIDURAL; INFILTRATION; INTRACAUDAL; PERINEURAL AS NEEDED
Status: DISCONTINUED | OUTPATIENT
Start: 2022-03-10 | End: 2022-03-10 | Stop reason: SURG

## 2022-03-10 RX ORDER — TRANEXAMIC ACID 10 MG/ML
INJECTION, SOLUTION INTRAVENOUS
Status: COMPLETED
Start: 2022-03-10 | End: 2022-03-10

## 2022-03-10 RX ORDER — BUPIVACAINE HCL/0.9 % NACL/PF 0.25 %
5 PLASTIC BAG, INJECTION (ML) EPIDURAL AS NEEDED
Status: DISCONTINUED | OUTPATIENT
Start: 2022-03-10 | End: 2022-03-10

## 2022-03-10 RX ORDER — IBUPROFEN 600 MG/1
600 TABLET ORAL EVERY 6 HOURS
Status: DISCONTINUED | OUTPATIENT
Start: 2022-03-10 | End: 2022-03-12

## 2022-03-10 RX ORDER — ACETAMINOPHEN 325 MG/1
650 TABLET ORAL EVERY 6 HOURS PRN
Status: DISCONTINUED | OUTPATIENT
Start: 2022-03-10 | End: 2022-03-12

## 2022-03-10 RX ORDER — SIMETHICONE 80 MG
80 TABLET,CHEWABLE ORAL 3 TIMES DAILY PRN
Status: DISCONTINUED | OUTPATIENT
Start: 2022-03-10 | End: 2022-03-12

## 2022-03-10 RX ORDER — BISACODYL 10 MG
10 SUPPOSITORY, RECTAL RECTAL ONCE AS NEEDED
Status: DISCONTINUED | OUTPATIENT
Start: 2022-03-10 | End: 2022-03-12

## 2022-03-10 RX ADMIN — LIDOCAINE HYDROCHLORIDE AND EPINEPHRINE 3 ML: 15; 5 INJECTION, SOLUTION EPIDURAL at 08:21:00

## 2022-03-10 RX ADMIN — LIDOCAINE HYDROCHLORIDE 25 MG: 10 INJECTION, SOLUTION EPIDURAL; INFILTRATION; INTRACAUDAL; PERINEURAL at 08:16:00

## 2022-03-10 NOTE — PROGRESS NOTES
admitted to room 105 for scheduled iol. Pt ambulated to room with  present. Pt oriented to room, poc discussed, questions answered. Pt changing into gown. Pt to bed, efm and toco applied. Initial assessment done.

## 2022-03-10 NOTE — PLAN OF CARE
Problem: Patient/Family Goals  Goal: Patient/Family Long Term Goal  Description: Patient's Long Term Goal:     Interventions:  -   - See additional Care Plan goals for specific interventions  3/10/2022 0711 by Rosy Faustin RN  Outcome: Progressing  Note: Uncomplicated vaginal delivery    Interventions:  VS per protocol  I&O  Ice chips and sips as tolerated  EFM per protocol  Maintain IV as ordered  Antibiotics as needed per protocol  Informed consent   3/10/2022 0710 by Rosy Faustin RN  Outcome: Progressing  Goal: Patient/Family Short Term Goal  Description: Patient's Short Term Goal:   Interventions:   -   - See additional Care Plan goals for specific interventions  3/10/2022 0711 by Rosy Faustin RN  Outcome: Progressing  Note: Adequate pain control with delivery of infant  Interventions:  Pain assessment scores as ordered  Patient scores pain a \"3\" or less  Multidisciplinary care   Nonpharmacologic comfort measures   3/10/2022 0710 by Rosy Faustin RN  Outcome: Progressing

## 2022-03-10 NOTE — PLAN OF CARE
Problem: BIRTH - VAGINAL/ SECTION  Goal: Fetal and maternal status remain reassuring during the birth process  Description: INTERVENTIONS:  - Monitor vital signs  - Monitor fetal heart rate  - Monitor uterine activity  - Monitor labor progression (vaginal delivery)  - DVT prophylaxis (C/S delivery)  - Surgical antibiotic prophylaxis (C/S delivery)  Outcome: Progressing     Problem: PAIN - ADULT  Goal: Verbalizes/displays adequate comfort level or patient's stated pain goal  Description: INTERVENTIONS:  - Encourage pt to monitor pain and request assistance  - Assess pain using appropriate pain scale  - Administer analgesics based on type and severity of pain and evaluate response  - Implement non-pharmacological measures as appropriate and evaluate response  - Consider cultural and social influences on pain and pain management  - Manage/alleviate anxiety  - Utilize distraction and/or relaxation techniques  - Monitor for opioid side effects  - Notify MD/LIP if interventions unsuccessful or patient reports new pain  - Anticipate increased pain with activity and pre-medicate as appropriate  Outcome: Progressing     Problem: ANXIETY  Goal: Will report anxiety at manageable levels  Description: INTERVENTIONS:  - Administer medication as ordered  - Teach and rehearse alternative coping skills  - Provide emotional support with 1:1 interaction with staff  Outcome: Progressing

## 2022-03-10 NOTE — ANESTHESIA PROCEDURE NOTES
Labor Analgesia  Performed by: Eve Park MD  Authorized by: Eve Park MD       General Information and Staff    Start Time:  3/10/2022 8:21 AM  End Time:  3/10/2022 8:22 AM  Anesthesiologist:  Eve Park MD  Performed by:   Anesthesiologist  Patient Location:  OB  Site Identification: surface landmarks    Reason for Block: labor epidural    Preanesthetic Checklist: patient identified, IV checked, risks and benefits discussed, monitors and equipment checked, pre-op evaluation, timeout performed, IV bolus, anesthesia consent and sterile technique used      Procedure Details    Patient Position:  Sitting  Prep: ChloraPrep    Monitoring:  Heart rate and continuous pulse ox  Approach:  Midline    Epidural Needle    Injection Technique:  ELIE air  Needle Type:  Tuohy  Needle Gauge:  17 G  Needle Length:  3.375 in  Needle Insertion Depth:  4  Location:  L3-4    Spinal Needle      Catheter    Catheter Type:  End hole  Catheter Size:  19 G  Catheter at Skin Depth:  10  Test Dose:  Negative    Assessment  Sensory Level:  T10    Additional Comments     Test Dose Given at 811   Fentanyl 2 mc/ml + Ropivicaine 0.15% epidural infusion 11cc/hr  Fentanyl 2 mc/ml + Ropivicaine 0.15% epidural bolus 8ml

## 2022-03-10 NOTE — PLAN OF CARE
Problem: SAFETY ADULT - FALL  Goal: Free from fall injury  Description: INTERVENTIONS:  - Assess pt frequently for physical needs  - Identify cognitive and physical deficits and behaviors that affect risk of falls.   - [place] fall precautions as indicated by assessment.  - Educate pt/family on patient safety including physical limitations  - Instruct pt to call for assistance with activity based on assessment  - Modify environment to reduce risk of injury  - Provide assistive devices as appropriate  - Consider OT/PT consult to assist with strengthening/mobility  - Encourage toileting schedule  Outcome: Progressing

## 2022-03-10 NOTE — PROGRESS NOTES
Pt transferred to 2199 via wheelchair. Pt in stable condition holding baby and accompanied by . Handoff given to [facility]. Bands verified and POC discussed.

## 2022-03-10 NOTE — PROGRESS NOTES
[place] [facility].  POC discussed for recovery of vaginal delivery

## 2022-03-10 NOTE — PLAN OF CARE
Problem: BIRTH - VAGINAL/ SECTION  Goal: Fetal and maternal status remain reassuring during the birth process  Description: INTERVENTIONS:  - Monitor vital signs  - Monitor fetal heart rate  - Monitor uterine activity  - Monitor labor progression (vaginal delivery)  - DVT prophylaxis (C/S delivery)  - Surgical antibiotic prophylaxis (C/S delivery)  Outcome: Progressing     Problem: PAIN - ADULT  Goal: Verbalizes/displays adequate comfort level or patient's stated pain goal  Description: INTERVENTIONS:  - Encourage pt to monitor pain and request assistance  - Assess pain using appropriate pain scale  - Administer analgesics based on type and severity of pain and evaluate response  - Implement non-pharmacological measures as appropriate and evaluate response  - Consider cultural and social influences on pain and pain management  - Manage/alleviate anxiety  - Utilize distraction and/or relaxation techniques  - Monitor for opioid side effects  - Notify MD/LIP if interventions unsuccessful or patient reports new pain  - Anticipate increased pain with activity and pre-medicate as appropriate  Outcome: Progressing     Problem: ANXIETY  Goal: Will report anxiety at manageable levels  Description: INTERVENTIONS:  - Administer medication as ordered  - Teach and rehearse alternative coping skills  - Provide emotional support with 1:1 interaction with staff  Outcome: Progressing     Problem: Patient/Family Goals  Goal: Patient/Family Long Term Goal  Description: Patient's Long Term Goal: Adequate pain management    Interventions:  -   - See additional Care Plan goals for specific interventions  Outcome: Progressing  Goal: Patient/Family Short Term Goal  Description: Patient's Short Term Goal: Safe vaginal delivery    Interventions:   -   - See additional Care Plan goals for specific interventions  Outcome: Progressing

## 2022-03-10 NOTE — PLAN OF CARE
Problem: BIRTH - VAGINAL/ SECTION  Goal: Fetal and maternal status remain reassuring during the birth process  Description: INTERVENTIONS:  - Monitor vital signs  - Monitor fetal heart rate  - Monitor uterine activity  - Monitor labor progression (vaginal delivery)  - DVT prophylaxis (C/S delivery)  - Surgical antibiotic prophylaxis (C/S delivery)  Outcome: Completed     Problem: PAIN - ADULT  Goal: Verbalizes/displays adequate comfort level or patient's stated pain goal  Description: INTERVENTIONS:  - Encourage pt to monitor pain and request assistance  - Assess pain using appropriate pain scale  - Administer analgesics based on type and severity of pain and evaluate response  - Implement non-pharmacological measures as appropriate and evaluate response  - Consider cultural and social influences on pain and pain management  - Manage/alleviate anxiety  - Utilize distraction and/or relaxation techniques  - Monitor for opioid side effects  - Notify MD/LIP if interventions unsuccessful or patient reports new pain  - Anticipate increased pain with activity and pre-medicate as appropriate  Outcome: Completed     Problem: ANXIETY  Goal: Will report anxiety at manageable levels  Description: INTERVENTIONS:  - Administer medication as ordered  - Teach and rehearse alternative coping skills  - Provide emotional support with 1:1 interaction with staff  Outcome: Completed     Problem: Patient/Family Goals  Goal: Patient/Family Long Term Goal  Description: Patient's Long Term Goal:     Interventions:  -   - See additional Care Plan goals for specific interventions  Outcome: Completed  Goal: Patient/Family Short Term Goal  Description: Patient's Short Term Goal:     Interventions:   -   - See additional Care Plan goals for specific interventions  Outcome: Completed

## 2022-03-10 NOTE — L&D DELIVERY NOTE
Sindi Huang, Girl [[id number]]    Labor Events     labor?: No   steroids?: None  Cervical ripening date/time: 3/9/2022 2111  Cervical ripening type: Misoprostol  Antibiotics received during labor?: No  Antibiotics (enter # doses in comment): none  Rupture date/time: 3/10/2022 0200     Rupture type: SROM  Fluid color: Clear  Induction: Misoprostol, Oxytocin  Indications for induction: IDDM/GDDM  Augmentation: None  Intrapartum & labor complications: Variable decelerations     Labor Event Times    Labor onset date/time: 3/10/2022 0830  Dilation complete date/time: 3/10/2022 1035  Start pushing date/time: 3/10/2022 1123     [place] Presentation    Presentation: Vertex  Position: Left Occiput Posterior     Operative Delivery    Operative Vaginal Delivery: Yes  Forceps attempted?: No  Vacuum extractor attempted?: Yes  Indications: Maternal Fatigue   Vacuum type: Kiwi Omni Cup (mushroom)   Vacuum application location: Low   Number of pop offs: 0      Failed?: No          Shoulder Dystocia    Shoulder Dystocia: No     Anesthesia    Method: Epidural           Delivery    Head delivery date/time: 3/10/2022 14:16:00   Delivery date/time:  3/10/22 14:16:00   Delivery type: Vaginal, Vacuum (Extractor)    Details:     Delivery location: delivery room     Delivery Providers    Delivering Clinician: Bob Moreau MD   Delivery personnel:  Provider Role   Reece Peres, RN Baby Nurse   Amrik Altamirano, RN Delivery Nurse         Cord    Vessels: 3 Vessels  Complications: Nuchal  # of loops: 1  Timed cord clamping: Yes  Time in sec: 30  Cord blood disposition: to lab  Gases sent?: No     Resuscitation    Method: Suctioning     [place] Measurements    Weight: 3390 g 7 lb 7.6 oz Length: 52.1 cm   Head circum.: 33.5 cm Chest circum. : 34 cm      Abdominal circum.: 31.5 cm       Placenta    Date/time: 3/10/2022 1419  Removal: Spontaneous  Disposition: held for future pathology     Apgars    Living status: Living   Apgar Scoring Key:    0 1 2    Skin color Blue or pale Acrocyanotic Completely pink    Heart rate Absent <100 bpm >100 bpm    Reflex irritability No response Grimace Cry or active withdrawal    Muscle tone Limp Some flexion Active motion    Respiratory effort Absent Weak cry; hypoventilation Good, crying              1 Minute:  5 Minute:  10 Minute:  15 Minute:  20 Minute:    Skin color: 0  1       Heart rate: 2  2       Reflex irritablity: 2  2       Muscle tone: 2  2       Respiratory effort: 2  2       Total: 8  9          Apgars assigned by: Aracelis Frazier RN  [place] disposition: with mother     Skin to Skin    Skin to skin initiated date/time: 3/10/2022  Skin to skin with: Mother     Vaginal Count    Initial count RN: Bhavana Barrera RN  Initial count Tech: Ronit Smoker A   Sponges   Sharps    Initial counts 11   0    Final counts           Delivery (Maternal)    Episiotomy: None  Perineal lacerations: 2nd Repaired?: Yes   Labial laceration: bilateral Repaired?: Yes   Sulcus laceration: bilateral Repaired?: Yes   Vaginal laceration?: No    Cervical laceration?: No    Clitoral laceration?: No            Pt is a 28year old y/o, [id number] 39w6d. low vacuum assisted vaginal delivery of female infant. Vacuum applied due to maternal exhaustion. Delivered after only one contraction and 2 gentle pulls. TOB-14:16, wt-7#,7oz, Apgar-8,9. Second degree perineal laceration, L robert-urethral laceration, R labial laceration and bilat vag sulcus tears  repaired with 2-0chromic and 3-0 and 4-0 vicryl. Placenta delivered spontaneously and intact. Cord vessel x3. epidural for anesthesia. [id number], mostly due to extensive vaginal lacerations.   No complications

## 2022-03-11 LAB
BASOPHILS # BLD AUTO: 0.06 X10(3) UL (ref 0–0.2)
BASOPHILS # BLD AUTO: 0.07 X10(3) UL (ref 0–0.2)
BASOPHILS NFR BLD AUTO: 0.3 %
EOSINOPHIL # BLD AUTO: 0.05 X10(3) UL (ref 0–0.7)
EOSINOPHIL # BLD AUTO: 0.07 X10(3) UL (ref 0–0.7)
EOSINOPHIL NFR BLD AUTO: 0.3 %
EOSINOPHIL NFR BLD AUTO: 0.4 %
ERYTHROCYTE [DISTWIDTH] IN BLOOD BY AUTOMATED COUNT: 13.9 %
ERYTHROCYTE [DISTWIDTH] IN BLOOD BY AUTOMATED COUNT: 14.1 %
HCT VFR BLD AUTO: 31.6 %
HCT VFR BLD AUTO: 34.4 %
HGB BLD-MCNC: 10.7 G/DL
HGB BLD-MCNC: 11.3 G/DL
IMM GRANULOCYTES # BLD AUTO: 0.13 X10(3) UL (ref 0–1)
IMM GRANULOCYTES # BLD AUTO: 0.18 X10(3) UL (ref 0–1)
IMM GRANULOCYTES NFR BLD: 0.7 %
IMM GRANULOCYTES NFR BLD: 1 %
LYMPHOCYTES # BLD AUTO: 2.32 X10(3) UL (ref 1–4)
LYMPHOCYTES # BLD AUTO: 2.64 X10(3) UL (ref 1–4)
LYMPHOCYTES NFR BLD AUTO: 13.5 %
LYMPHOCYTES NFR BLD AUTO: 13.9 %
MCH RBC QN AUTO: 31.1 PG (ref 26–34)
MCH RBC QN AUTO: 31.7 PG (ref 26–34)
MCHC RBC AUTO-ENTMCNC: 32.8 G/DL (ref 31–37)
MCHC RBC AUTO-ENTMCNC: 33.9 G/DL (ref 31–37)
MCV RBC AUTO: 93.5 FL
MCV RBC AUTO: 94.8 FL
MONOCYTES # BLD AUTO: 0.88 X10(3) UL (ref 0.1–1)
MONOCYTES # BLD AUTO: 1.04 X10(3) UL (ref 0.1–1)
MONOCYTES NFR BLD AUTO: 5.1 %
MONOCYTES NFR BLD AUTO: 5.5 %
NEUTROPHILS # BLD AUTO: 13.74 X10 (3) UL (ref 1.5–7.7)
NEUTROPHILS # BLD AUTO: 13.74 X10(3) UL (ref 1.5–7.7)
NEUTROPHILS # BLD AUTO: 14.99 X10 (3) UL (ref 1.5–7.7)
NEUTROPHILS # BLD AUTO: 14.99 X10(3) UL (ref 1.5–7.7)
NEUTROPHILS NFR BLD AUTO: 79.1 %
NEUTROPHILS NFR BLD AUTO: 79.8 %
PLATELET # BLD AUTO: 125 10(3)UL (ref 150–450)
PLATELET # BLD AUTO: 178 10(3)UL (ref 150–450)
RBC # BLD AUTO: 3.38 X10(6)UL
RBC # BLD AUTO: 3.63 X10(6)UL
WBC # BLD AUTO: 17.2 X10(3) UL (ref 4–11)
WBC # BLD AUTO: 18.9 X10(3) UL (ref 4–11)

## 2022-03-11 PROCEDURE — 85025 COMPLETE CBC W/AUTO DIFF WBC: CPT | Performed by: OBSTETRICS & GYNECOLOGY

## 2022-03-11 PROCEDURE — 93005 ELECTROCARDIOGRAM TRACING: CPT

## 2022-03-11 PROCEDURE — 93010 ELECTROCARDIOGRAM REPORT: CPT | Performed by: INTERNAL MEDICINE

## 2022-03-11 NOTE — CM/SW NOTE
03/11/22 1200   Referral Data   Referral Source Nurse   Referral Reason Counseling/support;Psychosocial assessment   OTHER   Post-partum risk assessment score 8     SW received order to f/u with pt due to her obtaining a 8 on EPDS. SW reviewed chart, and spoke with RN. Pt presented with a cheerful affect. Pt's spouse was also present. Pt's baby girl is the first child for the couple. Pt denies any h/o anxiety or depression. Pt discussed her thoughts and feelings around the delivery of her baby girl, and SW offered support. Pt states she has good support when she returns home including her parents who will be staying with them for 2 months. SW discussed coping skills including rest and self care when able. SW provided resources for support groups, and written signs and symptoms of Postpartum Depression/anxiety with [facility] resources for psychiatrist and counselors.      [place], [address] [facility]

## 2022-03-11 NOTE — PLAN OF CARE
Problem: SAFETY ADULT - FALL  Goal: Free from fall injury  Description: INTERVENTIONS:  - Assess pt frequently for physical needs  - Identify cognitive and physical deficits and behaviors that affect risk of falls. - [place] fall precautions as indicated by assessment.  - Educate pt/family on patient safety including physical limitations  - Instruct pt to call for assistance with activity based on assessment  - Modify environment to reduce risk of injury  - Provide assistive devices as appropriate  - Consider OT/PT consult to assist with strengthening/mobility  - Encourage toileting schedule  Outcome: Progressing     Problem: POSTPARTUM  Goal: Long Term Goal:Experiences normal postpartum course  Description: INTERVENTIONS:  - Assess and monitor vital signs and lab values. - Assess fundus and lochia. - Provide ice/sitz baths for perineum discomfort. - Monitor healing of incision/episiotomy/laceration, and assess for signs and symptoms of infection and hematoma. - Assess bladder function and monitor for bladder distention.  - Provide/instruct/assist with pericare as needed. - Provide VTE prophylaxis as needed. - Monitor bowel function.  - Encourage ambulation and provide assistance as needed. - Assess and monitor emotional status and provide social service/psych resources as needed. - Utilize standard precautions and use personal protective equipment as indicated. Ensure aseptic care of all intravenous lines and invasive tubes/drains.  - Obtain immunization and exposure to communicable diseases history. Outcome: Progressing  Goal: Optimize infant feeding at the breast  Description: INTERVENTIONS:  - Initiate breast feeding within first hour after birth. - Monitor effectiveness of current breast feeding efforts. - Assess support systems available to mother/family.  - Identify cultural beliefs/practices regarding lactation, letdown techniques, maternal food preferences.   - Assess mother's knowledge and previous experience with breast feeding.  - Provide information as needed about early infant feeding cues (e.g., rooting, lip smacking, sucking fingers/hand) versus late cue of crying.  - Discuss/demonstrate breast feeding aids (e.g., infant sling, nursing footstool/pillows, and breast pumps). - Encourage mother/other family members to express feelings/concerns, and actively listen. - Educate father/SO about benefits of breast feeding and how to manage common lactation challenges. - Recommend avoidance of specific medications or substances incompatible with breast feeding.  - Assess and monitor for signs of nipple pain/trauma. - Instruct and provide assistance with proper latch. - Review techniques for milk expression (breast pumping) and storage of breast milk. Provide pumping equipment/supplies, instructions and assistance, as needed. - Encourage rooming-in and breast feeding on demand.  - Encourage skin-to-skin contact. - Provide LC support as needed. - Assess for and manage engorgement. - Provide breast feeding education handouts and information on community breast feeding support. Outcome: Progressing  Goal: Establishment of adequate milk supply with medication/procedure interruptions  Description: INTERVENTIONS:  - Review techniques for milk expression (breast pumping). - Provide pumping equipment/supplies, instructions, and assistance until it is safe to breastfeed infant. Outcome: Progressing  Goal: Appropriate maternal -  bonding  Description: INTERVENTIONS:  - Assess caregiver- interactions. - Assess caregiver's emotional status and coping mechanisms. - Encourage caregiver to participate in  daily care. - Assess support systems available to mother/family.  - Provide /case management support as needed.   Outcome: Progressing

## 2022-03-11 NOTE — PROGRESS NOTES
Pt admitted to room 2199 in stable condition, but due to void. Pt up to bathroom, no void. Bleeding scant and fundus firm and midline. ID bands matched with Itzel LIRIANO x3. HUGS/KISSES in place. Educational materials at bedside.

## 2022-03-12 VITALS
RESPIRATION RATE: 17 BRPM | HEART RATE: 75 BPM | DIASTOLIC BLOOD PRESSURE: 73 MMHG | HEIGHT: 65 IN | OXYGEN SATURATION: 98 % | WEIGHT: 150 LBS | BODY MASS INDEX: 24.99 KG/M2 | TEMPERATURE: 98 F | SYSTOLIC BLOOD PRESSURE: 106 MMHG

## 2022-03-12 RX ORDER — METOPROLOL SUCCINATE 25 MG/1
25 TABLET, EXTENDED RELEASE ORAL
Status: DISCONTINUED | OUTPATIENT
Start: 2022-03-12 | End: 2022-03-12

## 2022-03-12 RX ORDER — METOPROLOL SUCCINATE 25 MG/1
25 TABLET, EXTENDED RELEASE ORAL
Qty: 30 TABLET | Refills: 11 | Status: SHIPPED | OUTPATIENT
Start: 2022-03-12

## 2022-03-12 NOTE — PROGRESS NOTES
Patient complaints: feels better today. Yesterday noted that she has a few seconds of feeling like it is harder to catch her breath when she stands up. It resolves within seconds and does not recur when ambulating. She denies any issues when recumbent, only with rising. It has improved today. No pain. No edema. Lochia decreasing. Vital signs reviewed    Examination:  General: alert, appropriate affect  Abdomen: Fundus firm and no unexpected tenderness. Remainder of abdomen unremarkable  Lochia: appropriate  Extremities: nontender, no excessive edema, symmetric    Recent Labs   Lab 03/09/22 2020 03/11/22  0625 03/11/22  1915   RBC 4.35 3.38* 3.63*   HGB 13.8 10.7* 11.3*   HCT 40.4 31.6* 34.4*   MCV 92.9 93.5 94.8   MCH 31.7 31.7 31.1   MCHC 34.2 33.9 32.8   RDW 13.9 13.9 14.1   NEPRELIM 7.96* 13.74* 14.99*   WBC 10.9 17.2* 18.9*   .0 125.0* 178.0          Impression:   Postpartum day #2 from vaginal birth, recuperating appropriately, anticipate routine discharge  Home today; call if any recurrent breathing issues.

## 2022-03-12 NOTE — PLAN OF CARE
Problem: SAFETY ADULT - FALL  Goal: Free from fall injury  Description: INTERVENTIONS:  - Assess pt frequently for physical needs  - Identify cognitive and physical deficits and behaviors that affect risk of falls. - [place] fall precautions as indicated by assessment.  - Educate pt/family on patient safety including physical limitations  - Instruct pt to call for assistance with activity based on assessment  - Modify environment to reduce risk of injury  - Provide assistive devices as appropriate  - Consider OT/PT consult to assist with strengthening/mobility  - Encourage toileting schedule  Outcome: Progressing     Problem: POSTPARTUM  Goal: Long Term Goal:Experiences normal postpartum course  Description: INTERVENTIONS:  - Assess and monitor vital signs and lab values. - Assess fundus and lochia. - Provide ice/sitz baths for perineum discomfort. - Monitor healing of incision/episiotomy/laceration, and assess for signs and symptoms of infection and hematoma. - Assess bladder function and monitor for bladder distention.  - Provide/instruct/assist with pericare as needed. - Provide VTE prophylaxis as needed. - Monitor bowel function.  - Encourage ambulation and provide assistance as needed. - Assess and monitor emotional status and provide social service/psych resources as needed. - Utilize standard precautions and use personal protective equipment as indicated. Ensure aseptic care of all intravenous lines and invasive tubes/drains.  - Obtain immunization and exposure to communicable diseases history. Outcome: Progressing  Goal: Optimize infant feeding at the breast  Description: INTERVENTIONS:  - Initiate breast feeding within first hour after birth. - Monitor effectiveness of current breast feeding efforts. - Assess support systems available to mother/family.  - Identify cultural beliefs/practices regarding lactation, letdown techniques, maternal food preferences.   - Assess mother's knowledge and previous experience with breast feeding.  - Provide information as needed about early infant feeding cues (e.g., rooting, lip smacking, sucking fingers/hand) versus late cue of crying.  - Discuss/demonstrate breast feeding aids (e.g., infant sling, nursing footstool/pillows, and breast pumps). - Encourage mother/other family members to express feelings/concerns, and actively listen. - Educate father/SO about benefits of breast feeding and how to manage common lactation challenges. - Recommend avoidance of specific medications or substances incompatible with breast feeding.  - Assess and monitor for signs of nipple pain/trauma. - Instruct and provide assistance with proper latch. - Review techniques for milk expression (breast pumping) and storage of breast milk. Provide pumping equipment/supplies, instructions and assistance, as needed. - Encourage rooming-in and breast feeding on demand.  - Encourage skin-to-skin contact. - Provide LC support as needed. - Assess for and manage engorgement. - Provide breast feeding education handouts and information on community breast feeding support. Outcome: Progressing  Goal: Establishment of adequate milk supply with medication/procedure interruptions  Description: INTERVENTIONS:  - Review techniques for milk expression (breast pumping). - Provide pumping equipment/supplies, instructions, and assistance until it is safe to breastfeed infant. Outcome: Progressing  Goal: Appropriate maternal -  bonding  Description: INTERVENTIONS:  - Assess caregiver- interactions. - Assess caregiver's emotional status and coping mechanisms. - Encourage caregiver to participate in  daily care. - Assess support systems available to mother/family.  - Provide /case management support as needed.   Outcome: Progressing

## 2022-03-12 NOTE — PLAN OF CARE
Problem: SAFETY ADULT - FALL  Goal: Free from fall injury  Description: INTERVENTIONS:  - Assess pt frequently for physical needs  - Identify cognitive and physical deficits and behaviors that affect risk of falls. - [place] fall precautions as indicated by assessment.  - Educate pt/family on patient safety including physical limitations  - Instruct pt to call for assistance with activity based on assessment  - Modify environment to reduce risk of injury  - Provide assistive devices as appropriate  - Consider OT/PT consult to assist with strengthening/mobility  - Encourage toileting schedule  Outcome: Completed     Problem: POSTPARTUM  Goal: Long Term Goal:Experiences normal postpartum course  Description: INTERVENTIONS:  - Assess and monitor vital signs and lab values. - Assess fundus and lochia. - Provide ice/sitz baths for perineum discomfort. - Monitor healing of incision/episiotomy/laceration, and assess for signs and symptoms of infection and hematoma. - Assess bladder function and monitor for bladder distention.  - Provide/instruct/assist with pericare as needed. - Provide VTE prophylaxis as needed. - Monitor bowel function.  - Encourage ambulation and provide assistance as needed. - Assess and monitor emotional status and provide social service/psych resources as needed. - Utilize standard precautions and use personal protective equipment as indicated. Ensure aseptic care of all intravenous lines and invasive tubes/drains.  - Obtain immunization and exposure to communicable diseases history. Outcome: Completed  Goal: Optimize infant feeding at the breast  Description: INTERVENTIONS:  - Initiate breast feeding within first hour after birth. - Monitor effectiveness of current breast feeding efforts. - Assess support systems available to mother/family.  - Identify cultural beliefs/practices regarding lactation, letdown techniques, maternal food preferences.   - Assess mother's knowledge and previous experience with breast feeding.  - Provide information as needed about early infant feeding cues (e.g., rooting, lip smacking, sucking fingers/hand) versus late cue of crying.  - Discuss/demonstrate breast feeding aids (e.g., infant sling, nursing footstool/pillows, and breast pumps). - Encourage mother/other family members to express feelings/concerns, and actively listen. - Educate father/SO about benefits of breast feeding and how to manage common lactation challenges. - Recommend avoidance of specific medications or substances incompatible with breast feeding.  - Assess and monitor for signs of nipple pain/trauma. - Instruct and provide assistance with proper latch. - Review techniques for milk expression (breast pumping) and storage of breast milk. Provide pumping equipment/supplies, instructions and assistance, as needed. - Encourage rooming-in and breast feeding on demand.  - Encourage skin-to-skin contact. - Provide LC support as needed. - Assess for and manage engorgement. - Provide breast feeding education handouts and information on community breast feeding support. Outcome: Completed  Goal: Establishment of adequate milk supply with medication/procedure interruptions  Description: INTERVENTIONS:  - Review techniques for milk expression (breast pumping). - Provide pumping equipment/supplies, instructions, and assistance until it is safe to breastfeed infant. Outcome: Completed  Goal: Appropriate maternal -  bonding  Description: INTERVENTIONS:  - Assess caregiver- interactions. - Assess caregiver's emotional status and coping mechanisms. - Encourage caregiver to participate in  daily care. - Assess support systems available to mother/family.  - Provide /case management support as needed.   Outcome: Completed

## 2022-03-12 NOTE — PROGRESS NOTES
Discharge patient home as order. Teaching complete, patient feel comfortable in taking care of herself and  infant. Hugs and kisses off. Sent both mom and infant to their family car @ 26.

## 2022-03-14 LAB
ATRIAL RATE: 78 BPM
P AXIS: 42 DEGREES
P-R INTERVAL: 140 MS
QRS DURATION: 70 MS
QTC CALCULATION (BEZET): 408 MS
R AXIS: 35 DEGREES
T AXIS: 33 DEGREES
VENTRICULAR RATE: 78 BPM

## 2022-03-15 ENCOUNTER — TELEPHONE (OUTPATIENT)
Dept: OBGYN UNIT | Facility: HOSPITAL | Age: 32
End: 2022-03-15

## 2022-03-16 ENCOUNTER — TELEPHONE (OUTPATIENT)
Dept: OBGYN UNIT | Facility: HOSPITAL | Age: 32
End: 2022-03-16

## 2022-04-05 PROBLEM — Z34.90 PREGNANCY: Status: RESOLVED | Noted: 2022-03-09 | Resolved: 2022-04-05

## 2022-04-05 PROBLEM — Z34.90 PREGNANCY (HCC): Status: RESOLVED | Noted: 2022-03-09 | Resolved: 2022-04-05

## 2022-05-16 ENCOUNTER — TELEPHONE (OUTPATIENT)
Dept: LACTATION | Facility: HOSPITAL | Age: 32
End: 2022-05-16

## 2022-05-16 NOTE — TELEPHONE ENCOUNTER
Pt calling with s/s mastitis, has had a plugged duct on L breast located on the outer aspect( from 8:00-12:00 on the face of a clock)for 1 day. She has chills, but is afebrile, Her breast is swollen and slightly red. Reviewed homecare for plugged ducts. Instructed to call OB for possible treatment of mastitis.   Pt verbalized understanding

## 2022-06-20 ENCOUNTER — OFFICE VISIT (OUTPATIENT)
Dept: FAMILY MEDICINE CLINIC | Facility: CLINIC | Age: 32
End: 2022-06-20
Payer: COMMERCIAL

## 2022-06-20 VITALS
RESPIRATION RATE: 16 BRPM | HEIGHT: 65 IN | WEIGHT: 129 LBS | SYSTOLIC BLOOD PRESSURE: 90 MMHG | DIASTOLIC BLOOD PRESSURE: 50 MMHG | OXYGEN SATURATION: 97 % | BODY MASS INDEX: 21.49 KG/M2 | HEART RATE: 80 BPM | TEMPERATURE: 98 F

## 2022-06-20 DIAGNOSIS — Z00.00 LABORATORY EXAMINATION ORDERED AS PART OF A ROUTINE GENERAL MEDICAL EXAMINATION: ICD-10-CM

## 2022-06-20 DIAGNOSIS — Z86.32 HISTORY OF GESTATIONAL DIABETES: ICD-10-CM

## 2022-06-20 DIAGNOSIS — Z00.00 ANNUAL PHYSICAL EXAM: Primary | ICD-10-CM

## 2022-06-20 PROCEDURE — 3078F DIAST BP <80 MM HG: CPT | Performed by: NURSE PRACTITIONER

## 2022-06-20 PROCEDURE — 99395 PREV VISIT EST AGE 18-39: CPT | Performed by: NURSE PRACTITIONER

## 2022-06-20 PROCEDURE — 3008F BODY MASS INDEX DOCD: CPT | Performed by: NURSE PRACTITIONER

## 2022-06-20 PROCEDURE — 3074F SYST BP LT 130 MM HG: CPT | Performed by: NURSE PRACTITIONER

## 2022-06-20 RX ORDER — CHOLECALCIFEROL (VITAMIN D3) 1250 MCG
CAPSULE ORAL
COMMUNITY

## 2022-06-30 ENCOUNTER — LAB ENCOUNTER (OUTPATIENT)
Dept: LAB | Age: 32
End: 2022-06-30
Attending: FAMILY MEDICINE
Payer: COMMERCIAL

## 2022-06-30 DIAGNOSIS — Z86.32 HISTORY OF GESTATIONAL DIABETES: ICD-10-CM

## 2022-06-30 DIAGNOSIS — Z00.00 ANNUAL PHYSICAL EXAM: ICD-10-CM

## 2022-06-30 DIAGNOSIS — Z00.00 LABORATORY EXAMINATION ORDERED AS PART OF A ROUTINE GENERAL MEDICAL EXAMINATION: ICD-10-CM

## 2022-06-30 LAB
ALBUMIN SERPL-MCNC: 3.8 G/DL (ref 3.4–5)
ALBUMIN/GLOB SERPL: 1.1 {RATIO} (ref 1–2)
ALP LIVER SERPL-CCNC: 83 U/L
ALT SERPL-CCNC: 23 U/L
ANION GAP SERPL CALC-SCNC: 7 MMOL/L (ref 0–18)
AST SERPL-CCNC: 23 U/L (ref 15–37)
BASOPHILS # BLD AUTO: 0.06 X10(3) UL (ref 0–0.2)
BASOPHILS NFR BLD AUTO: 0.8 %
BILIRUB SERPL-MCNC: 0.8 MG/DL (ref 0.1–2)
BUN BLD-MCNC: 16 MG/DL (ref 7–18)
CALCIUM BLD-MCNC: 9.3 MG/DL (ref 8.5–10.1)
CHLORIDE SERPL-SCNC: 109 MMOL/L (ref 98–112)
CHOLEST SERPL-MCNC: 134 MG/DL (ref ?–200)
CO2 SERPL-SCNC: 24 MMOL/L (ref 21–32)
CREAT BLD-MCNC: 0.73 MG/DL
EOSINOPHIL # BLD AUTO: 0.24 X10(3) UL (ref 0–0.7)
EOSINOPHIL NFR BLD AUTO: 3.3 %
ERYTHROCYTE [DISTWIDTH] IN BLOOD BY AUTOMATED COUNT: 13.3 %
EST. AVERAGE GLUCOSE BLD GHB EST-MCNC: 120 MG/DL (ref 68–126)
FASTING PATIENT LIPID ANSWER: YES
FASTING STATUS PATIENT QL REPORTED: YES
GLOBULIN PLAS-MCNC: 3.4 G/DL (ref 2.8–4.4)
GLUCOSE BLD-MCNC: 92 MG/DL (ref 70–99)
HBA1C MFR BLD: 5.8 % (ref ?–5.7)
HCT VFR BLD AUTO: 40.2 %
HDLC SERPL-MCNC: 73 MG/DL (ref 40–59)
HGB BLD-MCNC: 12.8 G/DL
IMM GRANULOCYTES # BLD AUTO: 0.02 X10(3) UL (ref 0–1)
IMM GRANULOCYTES NFR BLD: 0.3 %
LDLC SERPL CALC-MCNC: 51 MG/DL (ref ?–100)
LYMPHOCYTES # BLD AUTO: 2.72 X10(3) UL (ref 1–4)
LYMPHOCYTES NFR BLD AUTO: 37.1 %
MCH RBC QN AUTO: 29.4 PG (ref 26–34)
MCHC RBC AUTO-ENTMCNC: 31.8 G/DL (ref 31–37)
MCV RBC AUTO: 92.4 FL
MONOCYTES # BLD AUTO: 0.4 X10(3) UL (ref 0.1–1)
MONOCYTES NFR BLD AUTO: 5.4 %
NEUTROPHILS # BLD AUTO: 3.9 X10 (3) UL (ref 1.5–7.7)
NEUTROPHILS # BLD AUTO: 3.9 X10(3) UL (ref 1.5–7.7)
NEUTROPHILS NFR BLD AUTO: 53.1 %
NONHDLC SERPL-MCNC: 61 MG/DL (ref ?–130)
OSMOLALITY SERPL CALC.SUM OF ELEC: 291 MOSM/KG (ref 275–295)
PLATELET # BLD AUTO: 274 10(3)UL (ref 150–450)
POTASSIUM SERPL-SCNC: 4.3 MMOL/L (ref 3.5–5.1)
PROT SERPL-MCNC: 7.2 G/DL (ref 6.4–8.2)
RBC # BLD AUTO: 4.35 X10(6)UL
SODIUM SERPL-SCNC: 140 MMOL/L (ref 136–145)
TRIGL SERPL-MCNC: 39 MG/DL (ref 30–149)
TSI SER-ACNC: 1.73 MIU/ML (ref 0.36–3.74)
VLDLC SERPL CALC-MCNC: 6 MG/DL (ref 0–30)
WBC # BLD AUTO: 7.3 X10(3) UL (ref 4–11)

## 2022-06-30 PROCEDURE — 85025 COMPLETE CBC W/AUTO DIFF WBC: CPT

## 2022-06-30 PROCEDURE — 83036 HEMOGLOBIN GLYCOSYLATED A1C: CPT

## 2022-06-30 PROCEDURE — 80061 LIPID PANEL: CPT

## 2022-06-30 PROCEDURE — 84443 ASSAY THYROID STIM HORMONE: CPT

## 2022-06-30 PROCEDURE — 80053 COMPREHEN METABOLIC PANEL: CPT

## 2022-06-30 PROCEDURE — 36415 COLL VENOUS BLD VENIPUNCTURE: CPT

## 2022-09-28 ENCOUNTER — OFFICE VISIT (OUTPATIENT)
Dept: FAMILY MEDICINE CLINIC | Facility: CLINIC | Age: 32
End: 2022-09-28

## 2022-09-28 VITALS
BODY MASS INDEX: 22.66 KG/M2 | HEIGHT: 65 IN | RESPIRATION RATE: 20 BRPM | SYSTOLIC BLOOD PRESSURE: 100 MMHG | TEMPERATURE: 98 F | HEART RATE: 76 BPM | WEIGHT: 136 LBS | DIASTOLIC BLOOD PRESSURE: 62 MMHG

## 2022-09-28 DIAGNOSIS — R22.1 NODULE OF SKIN OF NECK: Primary | ICD-10-CM

## 2022-09-28 PROCEDURE — 3078F DIAST BP <80 MM HG: CPT | Performed by: STUDENT IN AN ORGANIZED HEALTH CARE EDUCATION/TRAINING PROGRAM

## 2022-09-28 PROCEDURE — 11200 RMVL SKIN TAGS UP TO&INC 15: CPT | Performed by: STUDENT IN AN ORGANIZED HEALTH CARE EDUCATION/TRAINING PROGRAM

## 2022-09-28 PROCEDURE — 88304 TISSUE EXAM BY PATHOLOGIST: CPT | Performed by: STUDENT IN AN ORGANIZED HEALTH CARE EDUCATION/TRAINING PROGRAM

## 2022-09-28 PROCEDURE — 3074F SYST BP LT 130 MM HG: CPT | Performed by: STUDENT IN AN ORGANIZED HEALTH CARE EDUCATION/TRAINING PROGRAM

## 2022-09-28 PROCEDURE — 88305 TISSUE EXAM BY PATHOLOGIST: CPT | Performed by: STUDENT IN AN ORGANIZED HEALTH CARE EDUCATION/TRAINING PROGRAM

## 2022-09-28 PROCEDURE — 3008F BODY MASS INDEX DOCD: CPT | Performed by: STUDENT IN AN ORGANIZED HEALTH CARE EDUCATION/TRAINING PROGRAM

## 2022-09-28 NOTE — PROCEDURES
Patient presented for removal of the 1 skin tags. Consent was obtained. Areas of the skin tags were identified and the area was prepped with alcohol wipes. 1 skin tag ws removed using forceps and sterile scissors. Skin tag was sent for pathology. Hemostasis was achieved with gentle pressure. Patient tolerated the procedure well. Discharge instructions were discussed.

## 2022-12-30 ENCOUNTER — APPOINTMENT (OUTPATIENT)
Dept: CT IMAGING | Age: 32
End: 2022-12-30
Attending: EMERGENCY MEDICINE
Payer: COMMERCIAL

## 2022-12-30 ENCOUNTER — HOSPITAL ENCOUNTER (OUTPATIENT)
Age: 32
Discharge: HOME | End: 2022-12-30
Attending: EMERGENCY MEDICINE
Payer: COMMERCIAL

## 2022-12-30 ENCOUNTER — HOSPITAL ENCOUNTER (EMERGENCY)
Facility: HOSPITAL | Age: 32
Discharge: LEFT BEFORE DISCHARGE | End: 2022-12-30
Payer: COMMERCIAL

## 2022-12-30 VITALS
HEART RATE: 99 BPM | RESPIRATION RATE: 20 BRPM | DIASTOLIC BLOOD PRESSURE: 84 MMHG | OXYGEN SATURATION: 97 % | WEIGHT: 135 LBS | TEMPERATURE: 98 F | SYSTOLIC BLOOD PRESSURE: 123 MMHG | BODY MASS INDEX: 22 KG/M2

## 2022-12-30 VITALS
SYSTOLIC BLOOD PRESSURE: 101 MMHG | OXYGEN SATURATION: 99 % | BODY MASS INDEX: 22.49 KG/M2 | RESPIRATION RATE: 16 BRPM | TEMPERATURE: 98 F | HEART RATE: 80 BPM | WEIGHT: 135 LBS | DIASTOLIC BLOOD PRESSURE: 59 MMHG | HEIGHT: 65 IN

## 2022-12-30 DIAGNOSIS — K52.9 GASTROENTERITIS: Primary | ICD-10-CM

## 2022-12-30 LAB
#MXD IC: 0.3 X10ˆ3/UL (ref 0.1–1)
B-HCG UR QL: NEGATIVE
BUN BLD-MCNC: 14 MG/DL (ref 7–18)
CHLORIDE BLD-SCNC: 102 MMOL/L (ref 98–112)
CO2 BLD-SCNC: 24 MMOL/L (ref 21–32)
CREAT BLD-MCNC: 0.5 MG/DL
GFR SERPLBLD BASED ON 1.73 SQ M-ARVRAT: 128 ML/MIN/1.73M2 (ref 60–?)
GLUCOSE BLD-MCNC: 99 MG/DL (ref 70–99)
HCT VFR BLD AUTO: 43.1 %
HCT VFR BLD CALC: 44 %
HGB BLD-MCNC: 14.1 G/DL
ISTAT IONIZED CALCIUM FOR CHEM 8: 1.15 MMOL/L (ref 1.12–1.32)
LYMPHOCYTES # BLD AUTO: 0.8 X10ˆ3/UL (ref 1–4)
LYMPHOCYTES NFR BLD AUTO: 5 %
MCH RBC QN AUTO: 29.1 PG (ref 26–34)
MCHC RBC AUTO-ENTMCNC: 32.7 G/DL (ref 31–37)
MCV RBC AUTO: 88.9 FL (ref 80–100)
MIXED CELL %: 1.8 %
NEUTROPHILS # BLD AUTO: 13.9 X10ˆ3/UL (ref 1.5–7.7)
NEUTROPHILS NFR BLD AUTO: 93.2 %
PLATELET # BLD AUTO: 289 X10ˆ3/UL (ref 150–450)
POTASSIUM BLD-SCNC: 3.7 MMOL/L (ref 3.6–5.1)
RBC # BLD AUTO: 4.85 X10ˆ6/UL
SODIUM BLD-SCNC: 138 MMOL/L (ref 136–145)
WBC # BLD AUTO: 15 X10ˆ3/UL (ref 4–11)

## 2022-12-30 PROCEDURE — 96374 THER/PROPH/DIAG INJ IV PUSH: CPT

## 2022-12-30 PROCEDURE — 99214 OFFICE O/P EST MOD 30 MIN: CPT

## 2022-12-30 PROCEDURE — 81025 URINE PREGNANCY TEST: CPT

## 2022-12-30 PROCEDURE — 74177 CT ABD & PELVIS W/CONTRAST: CPT | Performed by: EMERGENCY MEDICINE

## 2022-12-30 PROCEDURE — 96361 HYDRATE IV INFUSION ADD-ON: CPT

## 2022-12-30 PROCEDURE — 99215 OFFICE O/P EST HI 40 MIN: CPT

## 2022-12-30 PROCEDURE — 96375 TX/PRO/DX INJ NEW DRUG ADDON: CPT

## 2022-12-30 PROCEDURE — 99204 OFFICE O/P NEW MOD 45 MIN: CPT

## 2022-12-30 PROCEDURE — 85025 COMPLETE CBC W/AUTO DIFF WBC: CPT | Performed by: EMERGENCY MEDICINE

## 2022-12-30 PROCEDURE — S0028 INJECTION, FAMOTIDINE, 20 MG: HCPCS

## 2022-12-30 PROCEDURE — 80047 BASIC METABLC PNL IONIZED CA: CPT

## 2022-12-30 RX ORDER — FAMOTIDINE 10 MG/ML
20 INJECTION, SOLUTION INTRAVENOUS ONCE
Status: COMPLETED | OUTPATIENT
Start: 2022-12-30 | End: 2022-12-30

## 2022-12-30 RX ORDER — SODIUM CHLORIDE 9 MG/ML
1000 INJECTION, SOLUTION INTRAVENOUS ONCE
Status: COMPLETED | OUTPATIENT
Start: 2022-12-30 | End: 2022-12-30

## 2022-12-30 RX ORDER — ONDANSETRON 2 MG/ML
4 INJECTION INTRAMUSCULAR; INTRAVENOUS ONCE
Status: COMPLETED | OUTPATIENT
Start: 2022-12-30 | End: 2022-12-30

## 2022-12-30 RX ORDER — ONDANSETRON 4 MG/1
4 TABLET, ORALLY DISINTEGRATING ORAL EVERY 4 HOURS PRN
Qty: 10 TABLET | Refills: 0 | Status: SHIPPED | OUTPATIENT
Start: 2022-12-30 | End: 2023-01-06

## 2022-12-30 NOTE — ED INITIAL ASSESSMENT (HPI)
C/o abdominal pain with vomiting, diarrhea and dizziness started last night with near syncopal episode.

## 2022-12-30 NOTE — DISCHARGE INSTRUCTIONS
Push fluids to avoid dehydration  Take Zofran as needed for nausea every 4 hours  Take Imodium over-the-counter as needed for diarrhea  Follow-up with your primary care physician  Return if any worsening symptoms or any new concerns

## 2023-05-23 ENCOUNTER — PATIENT MESSAGE (OUTPATIENT)
Dept: FAMILY MEDICINE CLINIC | Facility: CLINIC | Age: 33
End: 2023-05-23

## 2023-05-23 ENCOUNTER — TELEPHONE (OUTPATIENT)
Dept: FAMILY MEDICINE CLINIC | Facility: CLINIC | Age: 33
End: 2023-05-23

## 2023-05-23 ENCOUNTER — HOSPITAL ENCOUNTER (OUTPATIENT)
Age: 33
Discharge: HOME | End: 2023-05-23
Payer: COMMERCIAL

## 2023-05-23 VITALS
HEART RATE: 97 BPM | DIASTOLIC BLOOD PRESSURE: 61 MMHG | RESPIRATION RATE: 16 BRPM | BODY MASS INDEX: 22 KG/M2 | WEIGHT: 135 LBS | OXYGEN SATURATION: 100 % | TEMPERATURE: 99 F | SYSTOLIC BLOOD PRESSURE: 117 MMHG

## 2023-05-23 DIAGNOSIS — S61.211A LACERATION OF LEFT INDEX FINGER WITHOUT FOREIGN BODY WITHOUT DAMAGE TO NAIL, INITIAL ENCOUNTER: Primary | ICD-10-CM

## 2023-05-23 DIAGNOSIS — S61.213A LACERATION OF LEFT MIDDLE FINGER WITHOUT FOREIGN BODY WITHOUT DAMAGE TO NAIL, INITIAL ENCOUNTER: ICD-10-CM

## 2023-05-23 PROCEDURE — 99213 OFFICE O/P EST LOW 20 MIN: CPT

## 2023-05-23 RX ORDER — SULFAMETHOXAZOLE AND TRIMETHOPRIM 800; 160 MG/1; MG/1
1 TABLET ORAL 2 TIMES DAILY
Qty: 14 TABLET | Refills: 0 | Status: SHIPPED | OUTPATIENT
Start: 2023-05-23 | End: 2023-05-30

## 2023-05-23 NOTE — TELEPHONE ENCOUNTER
S/w patient to see if she could come in today. She said that the 11:15 spot okayed by Emiliana Mauricio is doable for her. CRIS Easton given information to overbook.

## 2023-05-23 NOTE — TELEPHONE ENCOUNTER
Called patient 3x and left voicemail to inform her that after reviewing her pictures in more depth she would like for the patient to go to immediate care to get it looked at instead iof the office. madhav sent as well.

## 2023-05-23 NOTE — TELEPHONE ENCOUNTER
Pt seen at [address] [place] [address] today after laceration to fingers. On antibiotic. Needs wound check/follow up on Thursday. I can see her during open appt or can add her on at end of day (4:00).

## 2023-05-23 NOTE — TELEPHONE ENCOUNTER
Called Pt and informed that she needs to have follow up/wound check.     Pt verbalized understanding and scheduled for 5/25/23 at 11:30AM.

## 2023-10-30 ENCOUNTER — OFFICE VISIT (OUTPATIENT)
Dept: FAMILY MEDICINE CLINIC | Facility: CLINIC | Age: 33
End: 2023-10-30

## 2023-10-30 ENCOUNTER — LAB ENCOUNTER (OUTPATIENT)
Dept: LAB | Age: 33
End: 2023-10-30
Attending: NURSE PRACTITIONER
Payer: COMMERCIAL

## 2023-10-30 VITALS
WEIGHT: 143.81 LBS | SYSTOLIC BLOOD PRESSURE: 100 MMHG | BODY MASS INDEX: 23.11 KG/M2 | HEIGHT: 66 IN | DIASTOLIC BLOOD PRESSURE: 52 MMHG | HEART RATE: 80 BPM | RESPIRATION RATE: 16 BRPM | TEMPERATURE: 98 F

## 2023-10-30 DIAGNOSIS — Z00.00 LABORATORY EXAMINATION ORDERED AS PART OF A ROUTINE GENERAL MEDICAL EXAMINATION: ICD-10-CM

## 2023-10-30 DIAGNOSIS — Z86.32 HISTORY OF GESTATIONAL DIABETES: ICD-10-CM

## 2023-10-30 DIAGNOSIS — Z13.89 SCREENING FOR GENITOURINARY CONDITION: ICD-10-CM

## 2023-10-30 DIAGNOSIS — R73.03 PREDIABETES: ICD-10-CM

## 2023-10-30 DIAGNOSIS — Z23 NEED FOR VACCINATION: ICD-10-CM

## 2023-10-30 DIAGNOSIS — Z00.00 ANNUAL PHYSICAL EXAM: Primary | ICD-10-CM

## 2023-10-30 LAB
ALBUMIN SERPL-MCNC: 3.8 G/DL (ref 3.4–5)
ALBUMIN/GLOB SERPL: 1.1 {RATIO} (ref 1–2)
ALP LIVER SERPL-CCNC: 59 U/L
ALT SERPL-CCNC: 26 U/L
ANION GAP SERPL CALC-SCNC: 5 MMOL/L (ref 0–18)
AST SERPL-CCNC: 15 U/L (ref 15–37)
BASOPHILS # BLD AUTO: 0.04 X10(3) UL (ref 0–0.2)
BASOPHILS NFR BLD AUTO: 0.6 %
BILIRUB SERPL-MCNC: 0.8 MG/DL (ref 0.1–2)
BILIRUB UR QL STRIP.AUTO: NEGATIVE
BUN BLD-MCNC: 18 MG/DL (ref 7–18)
CALCIUM BLD-MCNC: 8.5 MG/DL (ref 8.5–10.1)
CHLORIDE SERPL-SCNC: 108 MMOL/L (ref 98–112)
CHOLEST SERPL-MCNC: 143 MG/DL (ref ?–200)
CLARITY UR REFRACT.AUTO: CLEAR
CO2 SERPL-SCNC: 24 MMOL/L (ref 21–32)
CREAT BLD-MCNC: 0.72 MG/DL
EGFRCR SERPLBLD CKD-EPI 2021: 113 ML/MIN/1.73M2 (ref 60–?)
EOSINOPHIL # BLD AUTO: 0.07 X10(3) UL (ref 0–0.7)
EOSINOPHIL NFR BLD AUTO: 1.1 %
ERYTHROCYTE [DISTWIDTH] IN BLOOD BY AUTOMATED COUNT: 12.5 %
EST. AVERAGE GLUCOSE BLD GHB EST-MCNC: 120 MG/DL (ref 68–126)
FASTING PATIENT LIPID ANSWER: YES
FASTING STATUS PATIENT QL REPORTED: YES
GLOBULIN PLAS-MCNC: 3.4 G/DL (ref 2.8–4.4)
GLUCOSE BLD-MCNC: 91 MG/DL (ref 70–99)
GLUCOSE UR STRIP.AUTO-MCNC: NORMAL MG/DL
HBA1C MFR BLD: 5.8 % (ref ?–5.7)
HCT VFR BLD AUTO: 40.3 %
HDLC SERPL-MCNC: 65 MG/DL (ref 40–59)
HGB BLD-MCNC: 13.3 G/DL
IMM GRANULOCYTES # BLD AUTO: 0.01 X10(3) UL (ref 0–1)
IMM GRANULOCYTES NFR BLD: 0.2 %
KETONES UR STRIP.AUTO-MCNC: NEGATIVE MG/DL
LDLC SERPL CALC-MCNC: 67 MG/DL (ref ?–100)
LEUKOCYTE ESTERASE UR QL STRIP.AUTO: 75
LYMPHOCYTES # BLD AUTO: 2.32 X10(3) UL (ref 1–4)
LYMPHOCYTES NFR BLD AUTO: 35.7 %
MCH RBC QN AUTO: 29.3 PG (ref 26–34)
MCHC RBC AUTO-ENTMCNC: 33 G/DL (ref 31–37)
MCV RBC AUTO: 88.8 FL
MONOCYTES # BLD AUTO: 0.44 X10(3) UL (ref 0.1–1)
MONOCYTES NFR BLD AUTO: 6.8 %
NEUTROPHILS # BLD AUTO: 3.62 X10 (3) UL (ref 1.5–7.7)
NEUTROPHILS # BLD AUTO: 3.62 X10(3) UL (ref 1.5–7.7)
NEUTROPHILS NFR BLD AUTO: 55.6 %
NITRITE UR QL STRIP.AUTO: NEGATIVE
NONHDLC SERPL-MCNC: 78 MG/DL (ref ?–130)
OSMOLALITY SERPL CALC.SUM OF ELEC: 285 MOSM/KG (ref 275–295)
PH UR STRIP.AUTO: 5.5 [PH] (ref 5–8)
PLATELET # BLD AUTO: 252 10(3)UL (ref 150–450)
POTASSIUM SERPL-SCNC: 3.8 MMOL/L (ref 3.5–5.1)
PROT SERPL-MCNC: 7.2 G/DL (ref 6.4–8.2)
PROT UR STRIP.AUTO-MCNC: NEGATIVE MG/DL
RBC # BLD AUTO: 4.54 X10(6)UL
RBC UR QL AUTO: NEGATIVE
SODIUM SERPL-SCNC: 137 MMOL/L (ref 136–145)
SP GR UR STRIP.AUTO: 1.03 (ref 1–1.03)
TRIGL SERPL-MCNC: 49 MG/DL (ref 30–149)
TSI SER-ACNC: 1.23 MIU/ML (ref 0.36–3.74)
UROBILINOGEN UR STRIP.AUTO-MCNC: NORMAL MG/DL
VLDLC SERPL CALC-MCNC: 7 MG/DL (ref 0–30)
WBC # BLD AUTO: 6.5 X10(3) UL (ref 4–11)

## 2023-10-30 PROCEDURE — 90472 IMMUNIZATION ADMIN EACH ADD: CPT | Performed by: NURSE PRACTITIONER

## 2023-10-30 PROCEDURE — 87086 URINE CULTURE/COLONY COUNT: CPT

## 2023-10-30 PROCEDURE — 85025 COMPLETE CBC W/AUTO DIFF WBC: CPT

## 2023-10-30 PROCEDURE — 90471 IMMUNIZATION ADMIN: CPT | Performed by: NURSE PRACTITIONER

## 2023-10-30 PROCEDURE — 90651 9VHPV VACCINE 2/3 DOSE IM: CPT | Performed by: NURSE PRACTITIONER

## 2023-10-30 PROCEDURE — 80061 LIPID PANEL: CPT

## 2023-10-30 PROCEDURE — 80053 COMPREHEN METABOLIC PANEL: CPT

## 2023-10-30 PROCEDURE — 3074F SYST BP LT 130 MM HG: CPT | Performed by: NURSE PRACTITIONER

## 2023-10-30 PROCEDURE — 90716 VAR VACCINE LIVE SUBQ: CPT | Performed by: NURSE PRACTITIONER

## 2023-10-30 PROCEDURE — 83036 HEMOGLOBIN GLYCOSYLATED A1C: CPT

## 2023-10-30 PROCEDURE — 3008F BODY MASS INDEX DOCD: CPT | Performed by: NURSE PRACTITIONER

## 2023-10-30 PROCEDURE — 90746 HEPB VACCINE 3 DOSE ADULT IM: CPT | Performed by: NURSE PRACTITIONER

## 2023-10-30 PROCEDURE — 81001 URINALYSIS AUTO W/SCOPE: CPT

## 2023-10-30 PROCEDURE — 99395 PREV VISIT EST AGE 18-39: CPT | Performed by: NURSE PRACTITIONER

## 2023-10-30 PROCEDURE — 3078F DIAST BP <80 MM HG: CPT | Performed by: NURSE PRACTITIONER

## 2023-10-30 PROCEDURE — 84443 ASSAY THYROID STIM HORMONE: CPT

## 2023-10-31 DIAGNOSIS — R73.01 IMPAIRED FASTING GLUCOSE: Primary | ICD-10-CM

## 2024-12-04 ENCOUNTER — HOSPITAL ENCOUNTER (EMERGENCY)
Facility: HOSPITAL | Age: 34
Discharge: LEFT BEFORE DISCHARGE | End: 2024-12-04
Payer: COMMERCIAL

## 2024-12-26 ENCOUNTER — HOSPITAL ENCOUNTER (OUTPATIENT)
Age: 34
Discharge: HOME | End: 2024-12-26
Payer: COMMERCIAL